# Patient Record
Sex: FEMALE | Race: WHITE | NOT HISPANIC OR LATINO | Employment: FULL TIME | URBAN - METROPOLITAN AREA
[De-identification: names, ages, dates, MRNs, and addresses within clinical notes are randomized per-mention and may not be internally consistent; named-entity substitution may affect disease eponyms.]

---

## 2017-01-03 RX ORDER — NORGESTIMATE AND ETHINYL ESTRADIOL
KIT
Qty: 28 TAB | Refills: 5 | Status: SHIPPED | OUTPATIENT
Start: 2017-01-03 | End: 2017-08-28

## 2017-02-13 ENCOUNTER — HOSPITAL ENCOUNTER (OUTPATIENT)
Dept: LAB | Facility: MEDICAL CENTER | Age: 22
End: 2017-02-13
Attending: NURSE PRACTITIONER
Payer: COMMERCIAL

## 2017-02-13 ENCOUNTER — HOSPITAL ENCOUNTER (OUTPATIENT)
Dept: LAB | Facility: MEDICAL CENTER | Age: 22
End: 2017-02-13
Attending: INTERNAL MEDICINE
Payer: COMMERCIAL

## 2017-02-13 DIAGNOSIS — G40.909 SEIZURE DISORDER (HCC): ICD-10-CM

## 2017-02-13 LAB
ALBUMIN SERPL BCP-MCNC: 3.9 G/DL (ref 3.2–4.9)
ALBUMIN/GLOB SERPL: 1 G/DL
ALP SERPL-CCNC: 53 U/L (ref 30–99)
ALT SERPL-CCNC: 18 U/L (ref 2–50)
ANION GAP SERPL CALC-SCNC: 8 MMOL/L (ref 0–11.9)
AST SERPL-CCNC: 15 U/L (ref 12–45)
BASOPHILS # BLD AUTO: 0.04 K/UL (ref 0–0.12)
BASOPHILS NFR BLD AUTO: 0.6 % (ref 0–1.8)
BILIRUB SERPL-MCNC: 0.3 MG/DL (ref 0.1–1.5)
BUN SERPL-MCNC: 16 MG/DL (ref 8–22)
CALCIUM SERPL-MCNC: 9.3 MG/DL (ref 8.5–10.5)
CHLORIDE SERPL-SCNC: 107 MMOL/L (ref 96–112)
CO2 SERPL-SCNC: 22 MMOL/L (ref 20–33)
CREAT SERPL-MCNC: 0.61 MG/DL (ref 0.5–1.4)
EOSINOPHIL # BLD: 0.02 K/UL (ref 0–0.51)
EOSINOPHIL NFR BLD AUTO: 0.3 % (ref 0–6.9)
ERYTHROCYTE [DISTWIDTH] IN BLOOD BY AUTOMATED COUNT: 36.6 FL (ref 35.9–50)
GLOBULIN SER CALC-MCNC: 4 G/DL (ref 1.9–3.5)
GLUCOSE SERPL-MCNC: 90 MG/DL (ref 65–99)
HCT VFR BLD AUTO: 38.8 % (ref 37–47)
HGB BLD-MCNC: 13 G/DL (ref 12–16)
IMM GRANULOCYTES # BLD AUTO: 0.02 K/UL (ref 0–0.11)
IMM GRANULOCYTES NFR BLD AUTO: 0.3 % (ref 0–0.9)
LYMPHOCYTES # BLD: 2.88 K/UL (ref 1–4.8)
LYMPHOCYTES NFR BLD AUTO: 40.4 % (ref 22–41)
MCH RBC QN AUTO: 29.9 PG (ref 27–33)
MCHC RBC AUTO-ENTMCNC: 33.5 G/DL (ref 33.6–35)
MCV RBC AUTO: 89.2 FL (ref 81.4–97.8)
MONOCYTES # BLD: 0.53 K/UL (ref 0–0.85)
MONOCYTES NFR BLD AUTO: 7.4 % (ref 0–13.4)
NEUTROPHILS # BLD: 3.63 K/UL (ref 2–7.15)
NEUTROPHILS NFR BLD AUTO: 51 % (ref 44–72)
NRBC # BLD AUTO: 0 K/UL
NRBC BLD-RTO: 0 /100 WBC
PLATELET # BLD AUTO: 172 K/UL (ref 164–446)
PMV BLD AUTO: 11.7 FL (ref 9–12.9)
POTASSIUM SERPL-SCNC: 4 MMOL/L (ref 3.6–5.5)
PROT SERPL-MCNC: 7.9 G/DL (ref 6–8.2)
RBC # BLD AUTO: 4.35 M/UL (ref 4.2–5.4)
SODIUM SERPL-SCNC: 137 MMOL/L (ref 135–145)
WBC # BLD AUTO: 7.1 K/UL (ref 4.8–10.8)

## 2017-02-13 PROCEDURE — 80177 DRUG SCRN QUAN LEVETIRACETAM: CPT | Mod: 91

## 2017-02-13 PROCEDURE — 85025 COMPLETE CBC W/AUTO DIFF WBC: CPT

## 2017-02-13 PROCEDURE — 80177 DRUG SCRN QUAN LEVETIRACETAM: CPT

## 2017-02-13 PROCEDURE — 80053 COMPREHEN METABOLIC PANEL: CPT

## 2017-02-13 PROCEDURE — 36415 COLL VENOUS BLD VENIPUNCTURE: CPT

## 2017-02-14 LAB
LEVETIRACETAM SERPL-MCNC: 43 UG/ML (ref 12–46)
LEVETIRACETAM SERPL-MCNC: 43 UG/ML (ref 12–46)

## 2017-02-27 ENCOUNTER — OFFICE VISIT (OUTPATIENT)
Dept: NEUROLOGY | Facility: MEDICAL CENTER | Age: 22
End: 2017-02-27
Payer: COMMERCIAL

## 2017-02-27 VITALS
RESPIRATION RATE: 16 BRPM | HEIGHT: 62 IN | SYSTOLIC BLOOD PRESSURE: 106 MMHG | BODY MASS INDEX: 35.22 KG/M2 | TEMPERATURE: 97.7 F | OXYGEN SATURATION: 95 % | WEIGHT: 191.4 LBS | HEART RATE: 80 BPM | DIASTOLIC BLOOD PRESSURE: 64 MMHG

## 2017-02-27 DIAGNOSIS — G40.909 SEIZURE DISORDER (HCC): ICD-10-CM

## 2017-02-27 DIAGNOSIS — G43.009 MIGRAINE WITHOUT AURA AND WITHOUT STATUS MIGRAINOSUS, NOT INTRACTABLE: ICD-10-CM

## 2017-02-27 PROCEDURE — 99213 OFFICE O/P EST LOW 20 MIN: CPT | Performed by: NURSE PRACTITIONER

## 2017-02-27 ASSESSMENT — ENCOUNTER SYMPTOMS
CONSTITUTIONAL NEGATIVE: 1
DIARRHEA: 0
NAUSEA: 0
DOUBLE VISION: 0
DEPRESSION: 0
MUSCULOSKELETAL NEGATIVE: 1
COUGH: 0
VOMITING: 0
HEADACHES: 0
ABDOMINAL PAIN: 0
SORE THROAT: 0
NERVOUS/ANXIOUS: 0

## 2017-02-27 ASSESSMENT — PATIENT HEALTH QUESTIONNAIRE - PHQ9: CLINICAL INTERPRETATION OF PHQ2 SCORE: 0

## 2017-02-27 NOTE — PROGRESS NOTES
"Subjective:      Laura Rosario is a 21 y.o. female who presents with Follow-Up for Primary Generalized Epilepsy.        HPI Appreciates the new Keppra XR.  She feels like her \"fogginess has cleared\".  Doing very well and does not need any changes.    Driving, does not like to drive at night.  But she has felt this way ever since she started driving.    Diagnosed with bronchitis and was treated intercurrently.     Ref. Range 2/13/2017 08:42   Keppra Latest Ref Range: 12-46 ug/mL 43     Current Outpatient Prescriptions   Medication Sig Dispense Refill   • MONONESSA 0.25-35 MG-MCG per tablet TAKE 1 TABLET BY MOUTH EVERY DAY 28 Tab 5   • Levetiracetam (KEPPRA XR) 750 MG TABLET SR 24 HR Take two tablets po qam and three tablets po qhs. 150 Tab 5   • Pyridoxine HCl (B-6 PO) Take  by mouth.     • pyridoxine 100 MG tablet Take 1 Tab by mouth every day. 30 Tab 11   • levetiracetam (KEPPRA) 500 MG Tab Take 1500mg PO BID. (Patient not taking: Reported on 2/27/2017) 180 Tab 5   • eletriptan (RELPAX) 40 MG tablet Take 1/2-1 tablet po at onset of headache, may repeat dose in 2 hours if unrelieved.  Do not exceed more than 2 tablets in 24 hours. (Patient not taking: Reported on 2/27/2017) 9 Tab 5   • lorazepam (ATIVAN) 1 MG Tab Take 1/2-1 tablet PO PRN breakthrough seizures.  Do not exceed more than 2 tablets in 24 hours unless directed otherwise by physician. 20 Tab 0   • vitamin D (CHOLECALCIFEROL) 1000 UNIT Tab Take 1,000 Units by mouth every day.     • Multiple Vitamin (MULTI VITAMIN DAILY PO) Take  by mouth.     • Cyanocobalamin (B-12 PO) Take  by mouth.       No current facility-administered medications for this visit.       Review of Systems   Constitutional: Negative.    HENT: Negative for hearing loss, nosebleeds and sore throat.         No recent head injury.   Eyes: Negative for double vision.        No new loss of vision.   Respiratory: Negative for cough.         No recent lung infections.   Cardiovascular: " "Negative for chest pain.   Gastrointestinal: Negative for nausea, vomiting, abdominal pain and diarrhea.   Genitourinary: Negative.    Musculoskeletal: Negative.    Skin: Negative.    Neurological: Negative for headaches.   Endo/Heme/Allergies:        No history of endocrine dysfunction.  No new problems.   Psychiatric/Behavioral: Negative for depression. The patient is not nervous/anxious.         No recent mood changes.          Objective:     /64 mmHg  Pulse 80  Temp(Src) 36.5 °C (97.7 °F)  Resp 16  Ht 1.575 m (5' 2.01\")  Wt 86.818 kg (191 lb 6.4 oz)  BMI 35.00 kg/m2  SpO2 95%     Physical Exam   Constitutional: She is oriented to person, place, and time. She appears well-developed and well-nourished.   HENT:   Head: Normocephalic and atraumatic.   Eyes: Pupils are equal, round, and reactive to light.   Neck: Normal range of motion.   Cardiovascular: Normal rate and regular rhythm.    Pulmonary/Chest: Effort normal.   Neurological: She is alert and oriented to person, place, and time. She exhibits normal muscle tone. Gait normal.   No observable changes in neurologic status.  See initial new patient examination for details.    Skin: Skin is warm.   Psychiatric: She has a normal mood and affect.               Assessment/Plan:     Seizure disorder, probable generalized epilepsy:  Three concerning events since May 2016.  Last seizure occurred August 5, 2016.  Currently taking LEV XR 1500-2250mg.     Ref. Range 2/13/2017 08:42   Keppra Latest Ref Range: 12-46 ug/mL 43     Headaches:  Occuring rarely and relieved effectively with NSAID's.    Obtain VEEG as a comparison study prior to next appointment.    Needs to be taking a MVI, Calcium 1000mg and Vit D3 1000IU per day.  Needs folic acid 1mg per day.  Needs to continue at least one form of birth control.  Continue Vit B6 100mg per day for irritability.    Needs a MedicAlert bracelet.    Counseled regarding cautious driving, particularly avoiding night " time driving.    Return for follow-up in 4 months.   I spent 35 minutes with this patient, over fifty percent was spent counseling patient on their condition, best management practices, reviewing test results and risks and benefits of treatment.

## 2017-03-09 ENCOUNTER — NON-PROVIDER VISIT (OUTPATIENT)
Dept: NEUROLOGY | Facility: MEDICAL CENTER | Age: 22
End: 2017-03-09
Payer: COMMERCIAL

## 2017-03-09 DIAGNOSIS — G40.909 SEIZURE DISORDER (HCC): ICD-10-CM

## 2017-03-09 PROCEDURE — 95951 PR EEG MONITORING/VIDEORECORD: CPT | Mod: 52 | Performed by: PSYCHIATRY & NEUROLOGY

## 2017-03-09 NOTE — MR AVS SNAPSHOT
Laura Rosario   3/9/2017 1:00 PM   Non-Provider Visit   MRN: [id number]    Department:  [facility]   Dept Phone:  [phone]    Description:  Female : 1995   [place]  [place] [place] [facility]           Allergies as of 3/9/2017     No Known Allergies      You were diagnosed with     Seizure disorder (CMS-[facility])   [858002]         Vital Signs     Smoking Status                   Never Smoker            Basic Information     Date Of Birth Sex Race Ethnicity Preferred Language    1995 Female White Non- English      Your appointments     Aug 28, 2017  9:00 AM   Follow Up Visit with CORNELIUS [facility]   [facility] (--)    [address], [address]  [facility] [address] [phone]           You will be receiving a confirmation call a few days before your appointment from our automated call confirmation system.              Problem List              ICD-10-CM Priority Class Noted - Resolved    Migraine without aura and without status migrainosus, not intractable G43.009   2016 - Present    BMI 32.0-32.9,adult Z68.32   2016 - Present    Uses birth control Z30.9   2016 - Present    Left upper arm pain M79.622   2016 - Present      Health Maintenance        Date Due Completion Dates    IMM VARICELLA (CHICKENPOX) VACCINE (1 of 2 - 2 Dose Adolescent Series) 2006 ---    IMM INFLUENZA (1) 2016 ---    PAP SMEAR 2017    IMM DTaP/Tdap/Td Vaccine (7 - Td) 2018, 1997, 2/15/1995, 1994, 1994, 1/10/1994            Current Immunizations     Dtap Vaccine 1997, 2/15/1995, 1994, 1994, 1/10/1994    HIB Vaccine (ACTHIB/HIBERIX) 2/15/1995, 1994, 1994, 1/10/1994    HPV Quadrivalent Vaccine (GARDASIL) 2012, 2012, 2012    Hepatitis A Vaccine, Ped/Adol 2010, 7/15/2008    Hepatitis B Vaccine Non-Recombivax (Ped/Adol) 1994, 1994, 1/10/1994    MMR Vaccine  12/8/1997, 2/15/1995    Tdap Vaccine 1/28/2008      Below and/or attached are the medications your provider expects you to take. Review all of your home medications and newly ordered medications with your provider and/or pharmacist. Follow medication instructions as directed by your provider and/or pharmacist. Please keep your medication list with you and share with your provider. Update the information when medications are discontinued, doses are changed, or new medications (including over-the-counter products) are added; and carry medication information at all times in the event of emergency situations     Allergies:  No Known Allergies          Medications  Valid as of: March 09, 2017 -  2:04 PM    Generic Name Brand Name Tablet Size Instructions for use    Cholecalciferol (Tab) cholecalciferol 1000 UNIT Take 1,000 Units by mouth every day.        Cyanocobalamin   Take  by mouth.        LevETIRAcetam (TABLET SR 24 HR) Levetiracetam 750 MG Take two tablets po qam and three tablets po qhs.        LORazepam (Tab) ATIVAN 1 MG Take 1/2-1 tablet PO PRN breakthrough seizures.  Do not exceed more than 2 tablets in 24 hours unless directed otherwise by physician.        Multiple Vitamin   Take  by mouth.        Norgestimate-Eth Estradiol (Tab) MONONESSA 0.25-35 MG-MCG TAKE 1 TABLET BY MOUTH EVERY DAY        Pyridoxine HCl   Take  by mouth.        Pyridoxine HCl (Tab) pyridoxine 100 MG Take 1 Tab by mouth every day.        .                 Medicines prescribed today were sent to:     [facility] [address] [place], [place] - [address]    [address] [facility] [address]    Phone: [phone] Fax: [phone]    Open 24 Hours?: No    [id number] - [place], [place] - [address]    [address] [facility] [address]    Phone: [phone] Fax: [phone]    Open 24 Hours?: No      Medication refill instructions:       If your prescription bottle indicates you have  medication refills left, it is not necessary to call your provider’s office. Please contact your pharmacy and they will refill your medication.    If your prescription bottle indicates you do not have any refills left, you may request refills at any time through one of the following ways: The online [employer] system (except Urgent Care), by calling your provider’s office, or by asking your pharmacy to contact your provider’s office with a refill request. Medication refills are processed only during regular business hours and may not be available until the next business day. Your provider may request additional information or to have a follow-up visit with you prior to refilling your medication.   *Please Note: Medication refills are assigned a new Rx number when refilled electronically. Your pharmacy may indicate that no refills were authorized even though a new prescription for the same medication is available at the pharmacy. Please request the medicine by name with the pharmacy before contacting your provider for a refill.           [employer] Access Code: Activation code not generated  Current [employer] Status: Active

## 2017-03-10 NOTE — PROGRESS NOTES
VIDEO ELECTROENCEPHALOGRAM REPORT      Referring provider: MARYSOL Rain.    DOS:  3/9/2017 (total recording of 26 minutes and 42 seconds).     INDICATION:  Laura Rosario 21 y.o. female presenting with history of primary generalized epilepsy. Follow up EEG to assess therapeutic response and rule out subclinical seizures.     CURRENT ANTIEPILEPTIC REGIMEN: Levetiracetam  mg po daily.     TECHNIQUE: 30 channel video electroencephalogram (EEG) was performed in accordance with the international 10-20 system. The study was reviewed in bipolar and referential montages. The recording examined the patient during wakeful and drowsy/sleep state(s).     DESCRIPTION OF THE RECORD:  During the wakefulness, the background showed a symmetrical 9 Hz alpha activity posteriorly with amplitude of 70 mV.  There was reactivity to eye closure/opening.  A normal anterior-posterior gradient was noted with faster beta frequencies seen anteriorly.  During drowsiness, theta/delta frequencies were seen.    During the sleep state, background shows diffuse high-amplitude 4-5 Hz delta activity.  Symmetrical high-amplitude sleep spindles and vertex sharps were seen in the leads over the central regions.     ACTIVATION PROCEDURES:   Hyperventilation was performed by the patient for a total of 3 minutes. The technician performing the test noted good effort. This technique produced electroencephalographic changes in keeping with the expected bilaterally synchronous, frontally predominant, high amplitude slow waves build up.     Intermittent Photic stimulation was performed in a stepwise fashion from 1 to 30 Hz and elicited a normal response (photic driving), most noticeable in the posterior leads.      ICTAL AND/OR INTERICTAL FINDINGS:   No focal or generalized epileptiform activity noted. No regional slowing was seen during this routine study.  No clinical events or seizures were reported or recorded during the study.     EKG:  sampling of the EKG recording demonstrated sinus rhythm.       INTERPRETATION:  This is a normal video EEG recording in the awake, drowsy, and sleep state(s).  Clinical correlation is recommended.    Note: A normal EEG does not rule out epilepsy.  If the clinical suspicion remains high for seizures, a prolonged recording to capture clinical or subclinical events may be helpful.        Jamil Almazan MD  Medical Director, Epilepsy and Neurodiagnostics.   Clinical  of Neurology [facility].   Diplomate in Neurology, Epilepsy, and Electrodiagnostic Medicine.   Office: [phone]  Fax: [phone]

## 2017-03-10 NOTE — PROCEDURES
VIDEO ELECTROENCEPHALOGRAM REPORT      Referring provider: MARYSOL Rain.    DOS:  3/9/2017 (total recording of 26 minutes and 42 seconds).     INDICATION:  Laura Rosario 21 y.o. female presenting with history of primary generalized epilepsy. Follow up EEG to assess therapeutic response and rule out subclinical seizures.     CURRENT ANTIEPILEPTIC REGIMEN: Levetiracetam  mg po daily.     TECHNIQUE: 30 channel video electroencephalogram (EEG) was performed in accordance with the international 10-20 system. The study was reviewed in bipolar and referential montages. The recording examined the patient during wakeful and drowsy/sleep state(s).     DESCRIPTION OF THE RECORD:  During the wakefulness, the background showed a symmetrical 9 Hz alpha activity posteriorly with amplitude of 70 mV.  There was reactivity to eye closure/opening.  A normal anterior-posterior gradient was noted with faster beta frequencies seen anteriorly.  During drowsiness, theta/delta frequencies were seen.    During the sleep state, background shows diffuse high-amplitude 4-5 Hz delta activity.  Symmetrical high-amplitude sleep spindles and vertex sharps were seen in the leads over the central regions.     ACTIVATION PROCEDURES:    Hyperventilation was performed by the patient for a total of 3 minutes. The technician performing the test noted good effort. This technique produced electroencephalographic changes in keeping with the expected bilaterally synchronous, frontally predominant, high amplitude slow waves build up.     Intermittent Photic stimulation was performed in a stepwise fashion from 1 to 30 Hz and elicited a normal response (photic driving), most noticeable in the posterior leads.      ICTAL AND/OR INTERICTAL FINDINGS:    No focal or generalized epileptiform activity noted. No regional slowing was seen during this routine study.  No clinical events or seizures were reported or recorded during the study.      EKG:  sampling of the EKG recording demonstrated sinus rhythm.        INTERPRETATION:  This is a normal video EEG recording in the awake, drowsy, and sleep state(s).  Clinical correlation is recommended.    Note: A normal EEG does not rule out epilepsy.  If the clinical suspicion remains high for seizures, a prolonged recording to capture clinical or subclinical events may be helpful.        Jamil Almazan MD  Medical Director, Epilepsy and Neurodiagnostics.    Clinical  of Neurology [facility].    Diplomate in Neurology, Epilepsy, and Electrodiagnostic Medicine.    Office: [phone]  Fax: [phone]    WOOD / AMADO    DD:  03/10/2017 07:14:21  DT:  03/10/2017 10:03:17    D#:  [id number]  Job#:  [id number]    cc: MAXWELL GREGG

## 2017-07-27 ENCOUNTER — HOSPITAL ENCOUNTER (OUTPATIENT)
Dept: RADIOLOGY | Facility: MEDICAL CENTER | Age: 22
End: 2017-07-27
Attending: INTERNAL MEDICINE
Payer: COMMERCIAL

## 2017-07-27 DIAGNOSIS — M54.17 LUMBOSACRAL RADICULITIS: ICD-10-CM

## 2017-07-27 PROCEDURE — 73502 X-RAY EXAM HIP UNI 2-3 VIEWS: CPT | Mod: RT

## 2017-07-31 ENCOUNTER — HOSPITAL ENCOUNTER (OUTPATIENT)
Facility: MEDICAL CENTER | Age: 22
End: 2017-07-31
Attending: INTERNAL MEDICINE
Payer: COMMERCIAL

## 2017-07-31 PROCEDURE — 88175 CYTOPATH C/V AUTO FLUID REDO: CPT

## 2017-07-31 PROCEDURE — 87591 N.GONORRHOEAE DNA AMP PROB: CPT

## 2017-07-31 PROCEDURE — 87491 CHLMYD TRACH DNA AMP PROBE: CPT

## 2017-08-01 LAB — CYTOLOGY REG CYTOL: NORMAL

## 2017-08-02 LAB
C TRACH DNA GENITAL QL NAA+PROBE: NEGATIVE
N GONORRHOEA DNA GENITAL QL NAA+PROBE: NEGATIVE
SPECIMEN SOURCE: NORMAL

## 2017-08-28 ENCOUNTER — OFFICE VISIT (OUTPATIENT)
Dept: NEUROLOGY | Facility: MEDICAL CENTER | Age: 22
End: 2017-08-28
Payer: COMMERCIAL

## 2017-08-28 VITALS
TEMPERATURE: 98.4 F | HEART RATE: 50 BPM | WEIGHT: 187.2 LBS | DIASTOLIC BLOOD PRESSURE: 60 MMHG | RESPIRATION RATE: 16 BRPM | HEIGHT: 62 IN | OXYGEN SATURATION: 95 % | BODY MASS INDEX: 34.45 KG/M2 | SYSTOLIC BLOOD PRESSURE: 100 MMHG

## 2017-08-28 DIAGNOSIS — G40.909 SEIZURE DISORDER (HCC): ICD-10-CM

## 2017-08-28 PROCEDURE — 99213 OFFICE O/P EST LOW 20 MIN: CPT | Performed by: NURSE PRACTITIONER

## 2017-08-28 RX ORDER — LEVETIRACETAM 750 MG/1
TABLET, FILM COATED, EXTENDED RELEASE ORAL
Qty: 150 TAB | Refills: 11 | Status: SHIPPED | OUTPATIENT
Start: 2017-08-28 | End: 2019-09-17

## 2017-08-28 RX ORDER — IBUPROFEN 800 MG/1
TABLET ORAL
Refills: 4 | COMMUNITY
Start: 2017-06-12

## 2017-08-28 RX ORDER — NORGESTIMATE-ETHINYL ESTRADIOL 7DAYSX3 28
TABLET ORAL
COMMUNITY
Start: 2017-08-08 | End: 2018-09-20

## 2017-08-28 ASSESSMENT — ENCOUNTER SYMPTOMS
SEIZURES: 0
NERVOUS/ANXIOUS: 0
DEPRESSION: 0
DIZZINESS: 0
SORE THROAT: 0
DIARRHEA: 0
VOMITING: 0
COUGH: 0
MUSCULOSKELETAL NEGATIVE: 1
DOUBLE VISION: 0
CONSTITUTIONAL NEGATIVE: 1
ABDOMINAL PAIN: 0
HEADACHES: 0
NAUSEA: 0

## 2017-08-28 NOTE — PROGRESS NOTES
Subjective:      Laura Rosario is a 21 y.o. female who presents with Follow-Up (Seizure disorder ([facility]))          HPI   States that her life is still in chaos but she does not expand.    She ultimately does not have any concerns today or questions for me.    Working full-time.      Ref. Range 2/13/2017 08:42   Keppra Latest Ref Range: 12-46 ug/mL 43          Current Outpatient Prescriptions   Medication Sig Dispense Refill   • ibuprofen (MOTRIN) 800 MG Tab TK 1 T PO Q 8 H PRF ORTHOPEDIC PAIN OR HEADACHE  4   • TRINESSA, 28, 0.18/0.215/0.25 MG-35 MCG Tab      • Levetiracetam 750 MG TABLET SR 24 HR TAKE 2 TABLETS BY MOUTH EVERY MORNING AND 3 TABLETS BY MOUTH AT BEDTIME 150 Tab 5   • lorazepam (ATIVAN) 1 MG Tab Take 1/2-1 tablet PO PRN breakthrough seizures.  Do not exceed more than 2 tablets in 24 hours unless directed otherwise by physician. 20 Tab 0   • vitamin D (CHOLECALCIFEROL) 1000 UNIT Tab Take 1,000 Units by mouth every day.     • Multiple Vitamin (MULTI VITAMIN DAILY PO) Take  by mouth.     • Cyanocobalamin (B-12 PO) Take  by mouth.       No current facility-administered medications for this visit.        Review of Systems   Constitutional: Negative.    HENT: Negative for hearing loss, nosebleeds and sore throat.         No recent head injury.   Eyes: Negative for double vision.        No new loss of vision.   Respiratory: Negative for cough.         No recent lung infections.   Cardiovascular: Negative for chest pain.   Gastrointestinal: Negative for abdominal pain, diarrhea, nausea and vomiting.   Genitourinary: Negative.    Musculoskeletal: Negative.    Skin: Negative.    Neurological: Negative for dizziness, seizures and headaches.   Endo/Heme/Allergies:        No history of endocrine dysfunction.  No new problems.   Psychiatric/Behavioral: Negative for depression. The patient is not nervous/anxious.         No recent mood changes.          Objective:     /60   Pulse (!) 50   Temp 36.9 °C  "(98.4 °F)   Resp 16   Ht 1.575 m (5' 2\")   Wt 84.9 kg (187 lb 3.2 oz)   SpO2 95%   BMI 34.24 kg/m²      Physical Exam   Constitutional: She is oriented to person, place, and time. She appears well-developed and well-nourished. No distress.   HENT:   Head: Normocephalic and atraumatic.   Eyes: Pupils are equal, round, and reactive to light.   Neck: Normal range of motion.   Cardiovascular: Normal rate and regular rhythm.  Exam reveals no gallop and no friction rub.    No murmur heard.  Pulmonary/Chest: Effort normal. No respiratory distress.   Abdominal: Soft.   Musculoskeletal: Normal range of motion.   Lymphadenopathy:     She has no cervical adenopathy.   Neurological: She is alert and oriented to person, place, and time. She displays no tremor. Gait normal.   No observable changes in neurologic status.  See initial new patient examination for details.       Skin: Skin is warm and dry.   Psychiatric: She has a normal mood and affect.           Assessment/Plan:     Seizure disorder, probable generalized epilepsy:  Last seizure occurred August 5, 2016.  Currently taking LEV XR 1500-2250mg.  No further concerns for seizures in one year now.       Ref. Range 2/13/2017 08:42   Keppra Latest Ref Range: 12-46 ug/mL 43      Headaches:  Occuring rarely and relieved effectively with NSAID's.     Obtain VEEG as a comparison study prior to next appointment.     Needs to be taking a MVI, Calcium 1000mg and Vit D3 1000IU per day.  Needs folic acid 1mg per day.  Needs to continue at least one form of birth control.  Continue Vit B6 100mg per day for irritability.     Needs a MedicAlert bracelet.     Counseled regarding cautious driving, particularly avoiding night time driving.     Return for follow-up in 6 months.   I spent 35 minutes with this patient, over fifty percent was spent counseling patient on their condition, best management practices, reviewing test results and risks and benefits of treatment.         "

## 2017-09-01 ENCOUNTER — APPOINTMENT (OUTPATIENT)
Dept: RADIOLOGY | Facility: MEDICAL CENTER | Age: 22
End: 2017-09-01
Attending: INTERNAL MEDICINE
Payer: COMMERCIAL

## 2017-09-01 DIAGNOSIS — M54.17 LUMBOSACRAL NEURITIS: ICD-10-CM

## 2017-09-01 PROCEDURE — 72148 MRI LUMBAR SPINE W/O DYE: CPT

## 2017-09-29 ENCOUNTER — HOSPITAL ENCOUNTER (OUTPATIENT)
Dept: LAB | Facility: MEDICAL CENTER | Age: 22
End: 2017-09-29
Attending: INTERNAL MEDICINE
Payer: COMMERCIAL

## 2017-09-29 LAB
ALBUMIN SERPL BCP-MCNC: 4 G/DL (ref 3.2–4.9)
ALBUMIN/GLOB SERPL: 1.1 G/DL
ALP SERPL-CCNC: 50 U/L (ref 30–99)
ALT SERPL-CCNC: 11 U/L (ref 2–50)
ANION GAP SERPL CALC-SCNC: 8 MMOL/L (ref 0–11.9)
AST SERPL-CCNC: 15 U/L (ref 12–45)
BASOPHILS # BLD AUTO: 0.6 % (ref 0–1.8)
BASOPHILS # BLD: 0.04 K/UL (ref 0–0.12)
BILIRUB SERPL-MCNC: 0.4 MG/DL (ref 0.1–1.5)
BUN SERPL-MCNC: 12 MG/DL (ref 8–22)
CALCIUM SERPL-MCNC: 9.5 MG/DL (ref 8.5–10.5)
CHLORIDE SERPL-SCNC: 107 MMOL/L (ref 96–112)
CHOLEST SERPL-MCNC: 167 MG/DL (ref 100–199)
CO2 SERPL-SCNC: 23 MMOL/L (ref 20–33)
CREAT SERPL-MCNC: 0.62 MG/DL (ref 0.5–1.4)
EOSINOPHIL # BLD AUTO: 0.07 K/UL (ref 0–0.51)
EOSINOPHIL NFR BLD: 1.1 % (ref 0–6.9)
ERYTHROCYTE [DISTWIDTH] IN BLOOD BY AUTOMATED COUNT: 38.6 FL (ref 35.9–50)
GFR SERPL CREATININE-BSD FRML MDRD: >60 ML/MIN/1.73 M 2
GLOBULIN SER CALC-MCNC: 3.8 G/DL (ref 1.9–3.5)
GLUCOSE SERPL-MCNC: 103 MG/DL (ref 65–99)
HCT VFR BLD AUTO: 41.7 % (ref 37–47)
HDLC SERPL-MCNC: 47 MG/DL
HGB BLD-MCNC: 14.6 G/DL (ref 12–16)
IMM GRANULOCYTES # BLD AUTO: 0.04 K/UL (ref 0–0.11)
IMM GRANULOCYTES NFR BLD AUTO: 0.6 % (ref 0–0.9)
LDLC SERPL CALC-MCNC: 101 MG/DL
LYMPHOCYTES # BLD AUTO: 3.02 K/UL (ref 1–4.8)
LYMPHOCYTES NFR BLD: 46.4 % (ref 22–41)
MCH RBC QN AUTO: 32.2 PG (ref 27–33)
MCHC RBC AUTO-ENTMCNC: 35 G/DL (ref 33.6–35)
MCV RBC AUTO: 91.9 FL (ref 81.4–97.8)
MONOCYTES # BLD AUTO: 0.5 K/UL (ref 0–0.85)
MONOCYTES NFR BLD AUTO: 7.7 % (ref 0–13.4)
NEUTROPHILS # BLD AUTO: 2.84 K/UL (ref 2–7.15)
NEUTROPHILS NFR BLD: 43.6 % (ref 44–72)
NRBC # BLD AUTO: 0 K/UL
NRBC BLD AUTO-RTO: 0 /100 WBC
PLATELET # BLD AUTO: 145 K/UL (ref 164–446)
PMV BLD AUTO: 13.1 FL (ref 9–12.9)
POTASSIUM SERPL-SCNC: 4 MMOL/L (ref 3.6–5.5)
PROT SERPL-MCNC: 7.8 G/DL (ref 6–8.2)
RBC # BLD AUTO: 4.54 M/UL (ref 4.2–5.4)
SODIUM SERPL-SCNC: 138 MMOL/L (ref 135–145)
TRIGL SERPL-MCNC: 94 MG/DL (ref 0–149)
WBC # BLD AUTO: 6.5 K/UL (ref 4.8–10.8)

## 2017-09-29 PROCEDURE — 36415 COLL VENOUS BLD VENIPUNCTURE: CPT

## 2017-09-29 PROCEDURE — 80053 COMPREHEN METABOLIC PANEL: CPT

## 2017-09-29 PROCEDURE — 85025 COMPLETE CBC W/AUTO DIFF WBC: CPT

## 2017-09-29 PROCEDURE — 80061 LIPID PANEL: CPT

## 2017-11-06 ENCOUNTER — HOSPITAL ENCOUNTER (OUTPATIENT)
Dept: LAB | Facility: MEDICAL CENTER | Age: 22
End: 2017-11-06
Attending: INTERNAL MEDICINE
Payer: COMMERCIAL

## 2017-11-06 LAB
EST. AVERAGE GLUCOSE BLD GHB EST-MCNC: 111 MG/DL
HBA1C MFR BLD: 5.5 % (ref 0–5.6)
T4 FREE SERPL-MCNC: 0.87 NG/DL (ref 0.53–1.43)
TSH SERPL DL<=0.005 MIU/L-ACNC: 1.04 UIU/ML (ref 0.3–3.7)

## 2017-11-06 PROCEDURE — 84439 ASSAY OF FREE THYROXINE: CPT

## 2017-11-06 PROCEDURE — 84443 ASSAY THYROID STIM HORMONE: CPT

## 2017-11-06 PROCEDURE — 83036 HEMOGLOBIN GLYCOSYLATED A1C: CPT

## 2017-11-06 PROCEDURE — 36415 COLL VENOUS BLD VENIPUNCTURE: CPT

## 2017-11-06 PROCEDURE — 80177 DRUG SCRN QUAN LEVETIRACETAM: CPT

## 2017-11-08 LAB — LEVETIRACETAM SERPL-MCNC: 36 UG/ML (ref 12–46)

## 2018-02-09 ENCOUNTER — HOSPITAL ENCOUNTER (OUTPATIENT)
Dept: RADIOLOGY | Facility: MEDICAL CENTER | Age: 23
End: 2018-02-09
Attending: INTERNAL MEDICINE
Payer: COMMERCIAL

## 2018-02-09 ENCOUNTER — HOSPITAL ENCOUNTER (OUTPATIENT)
Dept: LAB | Facility: MEDICAL CENTER | Age: 23
End: 2018-02-09
Attending: INTERNAL MEDICINE
Payer: COMMERCIAL

## 2018-02-09 DIAGNOSIS — R10.11 RUQ PAIN: ICD-10-CM

## 2018-02-09 LAB
ALBUMIN SERPL BCP-MCNC: 3.8 G/DL (ref 3.2–4.9)
ALBUMIN/GLOB SERPL: 1.1 G/DL
ALP SERPL-CCNC: 52 U/L (ref 30–99)
ALT SERPL-CCNC: 15 U/L (ref 2–50)
ANION GAP SERPL CALC-SCNC: 7 MMOL/L (ref 0–11.9)
AST SERPL-CCNC: 18 U/L (ref 12–45)
BASOPHILS # BLD AUTO: 0.5 % (ref 0–1.8)
BASOPHILS # BLD: 0.03 K/UL (ref 0–0.12)
BILIRUB SERPL-MCNC: 0.4 MG/DL (ref 0.1–1.5)
BUN SERPL-MCNC: 12 MG/DL (ref 8–22)
CALCIUM SERPL-MCNC: 9.1 MG/DL (ref 8.5–10.5)
CHLORIDE SERPL-SCNC: 109 MMOL/L (ref 96–112)
CO2 SERPL-SCNC: 25 MMOL/L (ref 20–33)
CREAT SERPL-MCNC: 0.63 MG/DL (ref 0.5–1.4)
EOSINOPHIL # BLD AUTO: 0.07 K/UL (ref 0–0.51)
EOSINOPHIL NFR BLD: 1.2 % (ref 0–6.9)
ERYTHROCYTE [DISTWIDTH] IN BLOOD BY AUTOMATED COUNT: 38.7 FL (ref 35.9–50)
GLOBULIN SER CALC-MCNC: 3.6 G/DL (ref 1.9–3.5)
GLUCOSE SERPL-MCNC: 83 MG/DL (ref 65–99)
HCT VFR BLD AUTO: 40.7 % (ref 37–47)
HGB BLD-MCNC: 13.4 G/DL (ref 12–16)
IMM GRANULOCYTES # BLD AUTO: 0.01 K/UL (ref 0–0.11)
IMM GRANULOCYTES NFR BLD AUTO: 0.2 % (ref 0–0.9)
LYMPHOCYTES # BLD AUTO: 2.35 K/UL (ref 1–4.8)
LYMPHOCYTES NFR BLD: 41 % (ref 22–41)
MCH RBC QN AUTO: 29.9 PG (ref 27–33)
MCHC RBC AUTO-ENTMCNC: 32.9 G/DL (ref 33.6–35)
MCV RBC AUTO: 90.8 FL (ref 81.4–97.8)
MONOCYTES # BLD AUTO: 0.44 K/UL (ref 0–0.85)
MONOCYTES NFR BLD AUTO: 7.7 % (ref 0–13.4)
NEUTROPHILS # BLD AUTO: 2.83 K/UL (ref 2–7.15)
NEUTROPHILS NFR BLD: 49.4 % (ref 44–72)
NRBC # BLD AUTO: 0 K/UL
NRBC BLD-RTO: 0 /100 WBC
PLATELET # BLD AUTO: 141 K/UL (ref 164–446)
PMV BLD AUTO: 13.6 FL (ref 9–12.9)
POTASSIUM SERPL-SCNC: 4.4 MMOL/L (ref 3.6–5.5)
PROT SERPL-MCNC: 7.4 G/DL (ref 6–8.2)
RBC # BLD AUTO: 4.48 M/UL (ref 4.2–5.4)
SODIUM SERPL-SCNC: 141 MMOL/L (ref 135–145)
WBC # BLD AUTO: 5.7 K/UL (ref 4.8–10.8)

## 2018-02-09 PROCEDURE — 76700 US EXAM ABDOM COMPLETE: CPT

## 2018-02-09 PROCEDURE — 36415 COLL VENOUS BLD VENIPUNCTURE: CPT

## 2018-02-09 PROCEDURE — 85025 COMPLETE CBC W/AUTO DIFF WBC: CPT

## 2018-02-09 PROCEDURE — 80053 COMPREHEN METABOLIC PANEL: CPT

## 2018-03-13 ENCOUNTER — TELEPHONE (OUTPATIENT)
Dept: NEUROLOGY | Facility: MEDICAL CENTER | Age: 23
End: 2018-03-13

## 2018-03-13 ENCOUNTER — APPOINTMENT (OUTPATIENT)
Dept: RADIOLOGY | Facility: MEDICAL CENTER | Age: 23
End: 2018-03-13
Attending: EMERGENCY MEDICINE
Payer: COMMERCIAL

## 2018-03-13 ENCOUNTER — HOSPITAL ENCOUNTER (EMERGENCY)
Facility: MEDICAL CENTER | Age: 23
End: 2018-03-13
Attending: EMERGENCY MEDICINE
Payer: COMMERCIAL

## 2018-03-13 VITALS
DIASTOLIC BLOOD PRESSURE: 51 MMHG | HEART RATE: 82 BPM | OXYGEN SATURATION: 96 % | TEMPERATURE: 97.9 F | RESPIRATION RATE: 16 BRPM | HEIGHT: 62 IN | SYSTOLIC BLOOD PRESSURE: 117 MMHG | WEIGHT: 205 LBS | BODY MASS INDEX: 37.73 KG/M2

## 2018-03-13 DIAGNOSIS — G40.909 SEIZURE DISORDER (HCC): ICD-10-CM

## 2018-03-13 DIAGNOSIS — R56.9 SEIZURE (HCC): ICD-10-CM

## 2018-03-13 LAB
ALBUMIN SERPL BCP-MCNC: 4.1 G/DL (ref 3.2–4.9)
ALBUMIN/GLOB SERPL: 1.1 G/DL
ALP SERPL-CCNC: 51 U/L (ref 30–99)
ALT SERPL-CCNC: 17 U/L (ref 2–50)
ANION GAP SERPL CALC-SCNC: 7 MMOL/L (ref 0–11.9)
APPEARANCE UR: ABNORMAL
AST SERPL-CCNC: 23 U/L (ref 12–45)
BACTERIA #/AREA URNS HPF: NEGATIVE /HPF
BASOPHILS # BLD AUTO: 0.4 % (ref 0–1.8)
BASOPHILS # BLD: 0.03 K/UL (ref 0–0.12)
BILIRUB SERPL-MCNC: 0.3 MG/DL (ref 0.1–1.5)
BILIRUB UR QL STRIP.AUTO: NEGATIVE
BUN SERPL-MCNC: 16 MG/DL (ref 8–22)
CALCIUM SERPL-MCNC: 9 MG/DL (ref 8.5–10.5)
CHLORIDE SERPL-SCNC: 106 MMOL/L (ref 96–112)
CO2 SERPL-SCNC: 24 MMOL/L (ref 20–33)
COLOR UR: YELLOW
CREAT SERPL-MCNC: 0.68 MG/DL (ref 0.5–1.4)
CULTURE IF INDICATED INDCX: NO UA CULTURE
EKG IMPRESSION: NORMAL
EOSINOPHIL # BLD AUTO: 0.07 K/UL (ref 0–0.51)
EOSINOPHIL NFR BLD: 0.9 % (ref 0–6.9)
EPI CELLS #/AREA URNS HPF: NEGATIVE /HPF
ERYTHROCYTE [DISTWIDTH] IN BLOOD BY AUTOMATED COUNT: 40.1 FL (ref 35.9–50)
GLOBULIN SER CALC-MCNC: 3.9 G/DL (ref 1.9–3.5)
GLUCOSE SERPL-MCNC: 80 MG/DL (ref 65–99)
GLUCOSE UR STRIP.AUTO-MCNC: NEGATIVE MG/DL
HCG SERPL QL: NEGATIVE
HCT VFR BLD AUTO: 43 % (ref 37–47)
HGB BLD-MCNC: 14.1 G/DL (ref 12–16)
HYALINE CASTS #/AREA URNS LPF: ABNORMAL /LPF
IMM GRANULOCYTES # BLD AUTO: 0.01 K/UL (ref 0–0.11)
IMM GRANULOCYTES NFR BLD AUTO: 0.1 % (ref 0–0.9)
KETONES UR STRIP.AUTO-MCNC: NEGATIVE MG/DL
LEUKOCYTE ESTERASE UR QL STRIP.AUTO: NEGATIVE
LYMPHOCYTES # BLD AUTO: 2.75 K/UL (ref 1–4.8)
LYMPHOCYTES NFR BLD: 37.3 % (ref 22–41)
MAGNESIUM SERPL-MCNC: 2.2 MG/DL (ref 1.5–2.5)
MCH RBC QN AUTO: 30.2 PG (ref 27–33)
MCHC RBC AUTO-ENTMCNC: 32.8 G/DL (ref 33.6–35)
MCV RBC AUTO: 92.1 FL (ref 81.4–97.8)
MICRO URNS: ABNORMAL
MONOCYTES # BLD AUTO: 0.72 K/UL (ref 0–0.85)
MONOCYTES NFR BLD AUTO: 9.8 % (ref 0–13.4)
NEUTROPHILS # BLD AUTO: 3.8 K/UL (ref 2–7.15)
NEUTROPHILS NFR BLD: 51.5 % (ref 44–72)
NITRITE UR QL STRIP.AUTO: NEGATIVE
NRBC # BLD AUTO: 0 K/UL
NRBC BLD-RTO: 0 /100 WBC
PH UR STRIP.AUTO: 5 [PH]
PLATELET # BLD AUTO: 140 K/UL (ref 164–446)
PMV BLD AUTO: 11.5 FL (ref 9–12.9)
POTASSIUM SERPL-SCNC: 4.8 MMOL/L (ref 3.6–5.5)
PROT SERPL-MCNC: 8 G/DL (ref 6–8.2)
PROT UR QL STRIP: NEGATIVE MG/DL
RBC # BLD AUTO: 4.67 M/UL (ref 4.2–5.4)
RBC # URNS HPF: ABNORMAL /HPF
RBC UR QL AUTO: NEGATIVE
SODIUM SERPL-SCNC: 137 MMOL/L (ref 135–145)
SP GR UR STRIP.AUTO: 1.03
UROBILINOGEN UR STRIP.AUTO-MCNC: 0.2 MG/DL
WBC # BLD AUTO: 7.4 K/UL (ref 4.8–10.8)
WBC #/AREA URNS HPF: ABNORMAL /HPF

## 2018-03-13 PROCEDURE — 93005 ELECTROCARDIOGRAM TRACING: CPT | Performed by: EMERGENCY MEDICINE

## 2018-03-13 PROCEDURE — 71045 X-RAY EXAM CHEST 1 VIEW: CPT

## 2018-03-13 PROCEDURE — 84703 CHORIONIC GONADOTROPIN ASSAY: CPT

## 2018-03-13 PROCEDURE — 99285 EMERGENCY DEPT VISIT HI MDM: CPT

## 2018-03-13 PROCEDURE — 700102 HCHG RX REV CODE 250 W/ 637 OVERRIDE(OP): Performed by: EMERGENCY MEDICINE

## 2018-03-13 PROCEDURE — 96374 THER/PROPH/DIAG INJ IV PUSH: CPT

## 2018-03-13 PROCEDURE — 80053 COMPREHEN METABOLIC PANEL: CPT

## 2018-03-13 PROCEDURE — 83735 ASSAY OF MAGNESIUM: CPT

## 2018-03-13 PROCEDURE — A9270 NON-COVERED ITEM OR SERVICE: HCPCS | Performed by: EMERGENCY MEDICINE

## 2018-03-13 PROCEDURE — 700111 HCHG RX REV CODE 636 W/ 250 OVERRIDE (IP): Performed by: EMERGENCY MEDICINE

## 2018-03-13 PROCEDURE — 81001 URINALYSIS AUTO W/SCOPE: CPT

## 2018-03-13 PROCEDURE — 85025 COMPLETE CBC W/AUTO DIFF WBC: CPT

## 2018-03-13 PROCEDURE — 36415 COLL VENOUS BLD VENIPUNCTURE: CPT

## 2018-03-13 RX ORDER — ONDANSETRON 4 MG/1
4 TABLET, ORALLY DISINTEGRATING ORAL ONCE
Status: COMPLETED | OUTPATIENT
Start: 2018-03-13 | End: 2018-03-13

## 2018-03-13 RX ORDER — IBUPROFEN 600 MG/1
600 TABLET ORAL ONCE
Status: COMPLETED | OUTPATIENT
Start: 2018-03-13 | End: 2018-03-13

## 2018-03-13 RX ORDER — LORAZEPAM 2 MG/ML
0.5 INJECTION INTRAMUSCULAR ONCE
Status: COMPLETED | OUTPATIENT
Start: 2018-03-13 | End: 2018-03-13

## 2018-03-13 RX ADMIN — LORAZEPAM 0.5 MG: 2 INJECTION INTRAMUSCULAR; INTRAVENOUS at 10:18

## 2018-03-13 RX ADMIN — IBUPROFEN 600 MG: 600 TABLET, FILM COATED ORAL at 10:19

## 2018-03-13 RX ADMIN — ONDANSETRON 4 MG: 4 TABLET, ORALLY DISINTEGRATING ORAL at 10:18

## 2018-03-13 ASSESSMENT — LIFESTYLE VARIABLES
HOW MANY TIMES IN THE PAST YEAR HAVE YOU HAD 5 OR MORE DRINKS IN A DAY: 0
HAVE YOU EVER FELT YOU SHOULD CUT DOWN ON YOUR DRINKING: NO
DO YOU DRINK ALCOHOL: YES
HAVE PEOPLE ANNOYED YOU BY CRITICIZING YOUR DRINKING: NO
TOTAL SCORE: 0
TOTAL SCORE: 0
AVERAGE NUMBER OF DAYS PER WEEK YOU HAVE A DRINK CONTAINING ALCOHOL: 2
TOTAL SCORE: 0
CONSUMPTION TOTAL: NEGATIVE
ON A TYPICAL DAY WHEN YOU DRINK ALCOHOL HOW MANY DRINKS DO YOU HAVE: 1
EVER FELT BAD OR GUILTY ABOUT YOUR DRINKING: NO
EVER HAD A DRINK FIRST THING IN THE MORNING TO STEADY YOUR NERVES TO GET RID OF A HANGOVER: NO

## 2018-03-13 ASSESSMENT — PAIN SCALES - GENERAL
PAINLEVEL_OUTOF10: 0
PAINLEVEL_OUTOF10: 0

## 2018-03-13 NOTE — ED NOTES
Pt had one episode of emesis, light tan in color.  She is requesting ibuprofen for neck pain.  Awaiting ERP.

## 2018-03-13 NOTE — ED NOTES
Provided written AVS instructions t pt regarding need for F/U with neurologists Dr. Malik and seizure precautions.  She verbalized understanding. And plans to visit Dr. Malik's office directly after leaving ED.  IV removed.  Pt ambulated out of ED in company of her Mom.

## 2018-03-13 NOTE — ED PROVIDER NOTES
ED Provider Note    CHIEF COMPLAINT  Chief Complaint   Patient presents with   • Seizure     witnessed seizure at work BIB ambulance.  Demonstrating some repetitive conversation per [facility]       HPI  Laura Rosario is a 22 y.o. female who presents after having a seizure. She has a known seizure disorder and is on Keppra. She takes 3 750 mg tablets of Keppra tonight and to Keppra 750 mg tablets in the morning. She took her medication at about 7:30 this morning. She reports that she hasn't been sleeping well at all over the last week. Denies any other stressors or illness. When she got to work she collapsed had a generalized tonic-clonic seizure. Lasted a few minutes. Resolved spontaneously. She was confused afterwards. She bit her tongue. She complains of headache and some neck pain as she has in the past. This is mild. No weakness numbness neurologic symptoms. No abdominal pain, nausea vomiting or diarrhea. No fevers. No URI symptoms    REVIEW OF SYSTEMS  As per HPI, otherwise a 10 point review of systems is negative    PAST MEDICAL HISTORY  Past Medical History:   Diagnosis Date   • Pneumonia 2011   • Seizure disorder ([facility])        SOCIAL HISTORY  Social History   Substance Use Topics   • Smoking status: Never Smoker   • Smokeless tobacco: Never Used   • Alcohol use 0.0 oz/week      Comment: Occasionally       SURGICAL HISTORY  Past Surgical History:   Procedure Laterality Date   • TONSILLECTOMY AND ADENOIDECTOMY Bilateral 1/8/2016    Procedure: TONSILLECTOMY AND ADENOIDECTOMY;  Surgeon: Esme Garcia M.D.;  Location: SURGERY [facility];  Service:    • DENTAL EXTRACTION(S)  7/2015    wisdom teeth       CURRENT MEDICATIONS  Home Medications     Reviewed by Joana Escobedo R.N. (Registered Nurse) on 03/13/18 at 0907  Med List Status: Partial   Medication Last Dose Status   Cyanocobalamin (B-12 PO) 8/28/2017 Active   ibuprofen (MOTRIN) 800 MG Tab prn Active   Levetiracetam 750 MG TABLET SR 24 HR   "Active   lorazepam (ATIVAN) 1 MG Tab prn Active   Multiple Vitamin (MULTI VITAMIN DAILY PO) 8/28/2017 Active   TRINESSA, 28, 0.18/0.215/0.25 MG-35 MCG Tab 8/28/2017 Active   vitamin D (CHOLECALCIFEROL) 1000 UNIT Tab 8/28/2017 Active                ALLERGIES  No Known Allergies    PHYSICAL EXAM  VITAL SIGNS: /51   Pulse 90   Temp 36.6 °C (97.9 °F)   Resp 16   Ht 1.575 m (5' 2\")   Wt 93 kg (205 lb)   LMP 02/25/2018 (Approximate)   SpO2 96%   BMI 37.49 kg/m²    Constitutional: Awake and alert  HENT:  Atraumatic, Normocephalic.Oropharynx dry mucus membranes, Nose normal inspection.   Eyes: Normal inspection  Neck: Supple  Cardiovascular: Normal heart rate, Normal rhythm.  Symmetric peripheral pulses.   Thorax & Lungs: No respiratory distress, No wheezing, No rales, No rhonchi, No chest tenderness.   Abdomen: Bowel sounds normal, soft, non-distended, nontender, no mass  Skin: Warm, Dry, No rash.   Back: No tenderness, No CVA tenderness.   Extremities: No clubbing, cyanosis, edema, no Homans or cords   Neurologic: Grossly normal   Psychiatric: Anxious appearing    RADIOLOGY/PROCEDURES  DX-CHEST-PORTABLE (1 VIEW)   Final Result      No acute cardiopulmonary process is seen.         Imaging is interpreted by radiologist    Labs:  Results for orders placed or performed during the hospital encounter of 03/13/18   CBC WITH DIFFERENTIAL   Result Value Ref Range    WBC 7.4 4.8 - 10.8 K/uL    RBC 4.67 4.20 - 5.40 M/uL    Hemoglobin 14.1 12.0 - 16.0 g/dL    Hematocrit 43.0 37.0 - 47.0 %    MCV 92.1 81.4 - 97.8 fL    MCH 30.2 27.0 - 33.0 pg    MCHC 32.8 (L) 33.6 - 35.0 g/dL    RDW 40.1 35.9 - 50.0 fL    Platelet Count 140 (L) 164 - 446 K/uL    MPV 11.5 9.0 - 12.9 fL    Neutrophils-Polys 51.50 44.00 - 72.00 %    Lymphocytes 37.30 22.00 - 41.00 %    Monocytes 9.80 0.00 - 13.40 %    Eosinophils 0.90 0.00 - 6.90 %    Basophils 0.40 0.00 - 1.80 %    Immature Granulocytes 0.10 0.00 - 0.90 %    Nucleated RBC 0.00 /100 WBC    " Neutrophils (Absolute) 3.80 2.00 - 7.15 K/uL    Lymphs (Absolute) 2.75 1.00 - 4.80 K/uL    Monos (Absolute) 0.72 0.00 - 0.85 K/uL    Eos (Absolute) 0.07 0.00 - 0.51 K/uL    Baso (Absolute) 0.03 0.00 - 0.12 K/uL    Immature Granulocytes (abs) 0.01 0.00 - 0.11 K/uL    NRBC (Absolute) 0.00 K/uL   COMP METABOLIC PANEL   Result Value Ref Range    Sodium 137 135 - 145 mmol/L    Potassium 4.8 3.6 - 5.5 mmol/L    Chloride 106 96 - 112 mmol/L    Co2 24 20 - 33 mmol/L    Anion Gap 7.0 0.0 - 11.9    Glucose 80 65 - 99 mg/dL    Bun 16 8 - 22 mg/dL    Creatinine 0.68 0.50 - 1.40 mg/dL    Calcium 9.0 8.5 - 10.5 mg/dL    AST(SGOT) 23 12 - 45 U/L    ALT(SGPT) 17 2 - 50 U/L    Alkaline Phosphatase 51 30 - 99 U/L    Total Bilirubin 0.3 0.1 - 1.5 mg/dL    Albumin 4.1 3.2 - 4.9 g/dL    Total Protein 8.0 6.0 - 8.2 g/dL    Globulin 3.9 (H) 1.9 - 3.5 g/dL    A-G Ratio 1.1 g/dL   HCG Qual Serum   Result Value Ref Range    Beta-Hcg Qualitative Serum Negative Negative   MAGNESIUM   Result Value Ref Range    Magnesium 2.2 1.5 - 2.5 mg/dL   URINALYSIS,CULTURE IF INDICATED   Result Value Ref Range    Color Yellow     Character Cloudy (A)     Specific Gravity 1.032 <1.035    Ph 5.0 5.0 - 8.0    Glucose Negative Negative mg/dL    Ketones Negative Negative mg/dL    Protein Negative Negative mg/dL    Bilirubin Negative Negative    Urobilinogen, Urine 0.2 Negative    Nitrite Negative Negative    Leukocyte Esterase Negative Negative    Occult Blood Negative Negative    Micro Urine Req Microscopic     Culture Indicated No UA Culture   ESTIMATED GFR   Result Value Ref Range    GFR If African American >60 >60 mL/min/1.73 m 2    GFR If Non African American >60 >60 mL/min/1.73 m 2   URINE MICROSCOPIC (W/UA)   Result Value Ref Range    WBC 0-2 /hpf    RBC 5-10 (A) /hpf    Bacteria Negative None /hpf    Epithelial Cells Negative /hpf    Hyaline Cast 0-2 /lpf   EKG (ER)   Result Value Ref Range    Report       [facility] Emergency  Dept.    Test Date:  2018  Pt Name:    JAMAICA ORO                 Department: ER  MRN:        [id number]                      Room:       BL 21  Gender:     Female                       Technician: [id number]  :        1995                   Requested By:TRAVIS FLANAGAN  Order #:    [id number]                    Reading MD: TRAVIS FLANAGAN MD    Measurements  Intervals                                Axis  Rate:       81                           P:          21  NH:         148                          QRS:        25  QRSD:       88                           T:          25  QT:         376  QTc:        437    Interpretive Statements  SINUS RHYTHM  No previous ECG available for comparison    Electronically Signed On 3- 14:08:11 PDT by TRAVIS FLANAGAN MD           COURSE & MEDICAL DECISION MAKING  Patient presents to ER with recurrent seizure. She appears well on exam. Data was obtained and was reassuring. No source of lowered seizure threshold was identified. She was given 0.5 mg of Ativan intravenously. She felt nauseous and was given Zofran orally. She was given ibuprofen orally. Her neck pain and headache resolved. Repeat exam was benign. Observed over several hours. No new symptoms.    Discussed with the patient's neurologist, Lisa Malik. She would like to keep the patient's medications the same for now. A repeat Keppra trough will be ordered. Patient will be seen in the office. Patient does have Ativan prescription if she has any prodrome. Given precautions regarding seizure and activities to avoid. She'll return to the ER for recurrent seizures or concern.      FINAL IMPRESSION  1. Seizure      This dictation was created using voice recognition software. The accuracy of the dictation is limited to the abilities of the software.  The nursing notes were reviewed and certain aspects of this information were incorporated into this note.      Electronically signed by: Travis Flanagan, 3/13/2018  2:06 PM

## 2018-03-13 NOTE — ED TRIAGE NOTES
Chief Complaint   Patient presents with   • Seizure     witnessed seizure at work BIB ambulance.  Demonstrating some repetitive conversation per JOSE     Pt conversant upon arrival to ED.  She reports having taken her Keppra this am.

## 2018-03-13 NOTE — DISCHARGE INSTRUCTIONS
Driving and Equipment Restrictions  Some medical problems make it dangerous to drive, ride a bike, or use machines. Some of these problems are:  · A hard blow to the head (concussion).  · Passing out (fainting).  · Twitching and shaking (seizures).  · Low blood sugar.  · Taking medicine to help you relax (sedatives).  · Taking pain medicines.  · Wearing an eye patch.  · Wearing splints. This can make it hard to use parts of your body that you need to drive safely.  HOME CARE   · Do not drive until your doctor says it is okay.  · Do not use machines until your doctor says it is okay.  You may need a form signed by your doctor (medical release) before you can drive again. You may also need this form before you do other tasks where you need to be fully alert.  MAKE SURE YOU:  · Understand these instructions.  · Will watch your condition.  · Will get help right away if you are not doing well or get worse.     This information is not intended to replace advice given to you by your health care provider. Make sure you discuss any questions you have with your health care provider.     Document Released: 01/25/2006 Document Revised: 03/11/2013 Document Reviewed: 04/27/2011  [employer] Interactive Patient Education ©2016 [employer]

## 2018-03-13 NOTE — TELEPHONE ENCOUNTER
Patient scheduled for a follow up appointment 3/21/18 @ 9:20a.m.     Explained to patient that we needed labs, fasting and tough.

## 2018-03-13 NOTE — ED TRIAGE NOTES
Pt reports upon arrival to ED that she had 2 seizures 2 years ago, but has not had one until today.

## 2018-03-16 ENCOUNTER — HOSPITAL ENCOUNTER (OUTPATIENT)
Dept: LAB | Facility: MEDICAL CENTER | Age: 23
End: 2018-03-16
Attending: NURSE PRACTITIONER
Payer: COMMERCIAL

## 2018-03-16 DIAGNOSIS — G40.909 SEIZURE DISORDER (HCC): ICD-10-CM

## 2018-03-16 LAB — 25(OH)D3 SERPL-MCNC: 19 NG/ML (ref 30–100)

## 2018-03-16 PROCEDURE — 36415 COLL VENOUS BLD VENIPUNCTURE: CPT

## 2018-03-16 PROCEDURE — 80177 DRUG SCRN QUAN LEVETIRACETAM: CPT

## 2018-03-16 PROCEDURE — 82306 VITAMIN D 25 HYDROXY: CPT

## 2018-03-17 LAB — LEVETIRACETAM SERPL-MCNC: 35 UG/ML (ref 12–46)

## 2018-03-19 ENCOUNTER — TELEPHONE (OUTPATIENT)
Dept: NEUROLOGY | Facility: MEDICAL CENTER | Age: 23
End: 2018-03-19

## 2018-03-21 ENCOUNTER — OFFICE VISIT (OUTPATIENT)
Dept: NEUROLOGY | Facility: MEDICAL CENTER | Age: 23
End: 2018-03-21
Payer: COMMERCIAL

## 2018-03-21 VITALS
BODY MASS INDEX: 39.31 KG/M2 | RESPIRATION RATE: 16 BRPM | TEMPERATURE: 97.9 F | OXYGEN SATURATION: 97 % | SYSTOLIC BLOOD PRESSURE: 110 MMHG | WEIGHT: 213.63 LBS | DIASTOLIC BLOOD PRESSURE: 68 MMHG | HEART RATE: 60 BPM | HEIGHT: 62 IN

## 2018-03-21 DIAGNOSIS — G40.909 SEIZURE DISORDER (HCC): ICD-10-CM

## 2018-03-21 DIAGNOSIS — G43.009 MIGRAINE WITHOUT AURA AND WITHOUT STATUS MIGRAINOSUS, NOT INTRACTABLE: ICD-10-CM

## 2018-03-21 PROCEDURE — 99213 OFFICE O/P EST LOW 20 MIN: CPT | Performed by: NURSE PRACTITIONER

## 2018-03-21 ASSESSMENT — ENCOUNTER SYMPTOMS
ABDOMINAL PAIN: 0
LOSS OF CONSCIOUSNESS: 1
VOMITING: 0
NERVOUS/ANXIOUS: 0
HEADACHES: 0
SORE THROAT: 0
DOUBLE VISION: 0
DIARRHEA: 0
NAUSEA: 0
MUSCULOSKELETAL NEGATIVE: 1
DEPRESSION: 0
CONSTITUTIONAL NEGATIVE: 1
COUGH: 0

## 2018-03-21 ASSESSMENT — PATIENT HEALTH QUESTIONNAIRE - PHQ9: CLINICAL INTERPRETATION OF PHQ2 SCORE: 0

## 2018-03-21 NOTE — PROGRESS NOTES
Subjective:      Laura Rosario is a 22 y.o. female who presents with Follow-Up (Seizure disorder)      Here with mother today.    HPI  3/13/18, went to work and was feeling hungry.  She went to the clocking in/out machine.  She felt dizzy and helped herself hold onto the wall and lowered herself to the floor. She was minimally responsive.  She was opening the store at the time.  She was transported into the ED.    Otherwise she was able to go back to work the next day.  She recovered quickly.    Has been healthy in general.    Working full-time, just got engaged and bought a home.    PCP yearly had told her that she was healthy last week.     Ref. Range 3/16/2018 08:35   Keppra Latest Ref Range: 12 - 46 ug/mL 35       Current Outpatient Prescriptions   Medication Sig Dispense Refill   • ibuprofen (MOTRIN) 800 MG Tab TK 1 T PO Q 8 H PRF ORTHOPEDIC PAIN OR HEADACHE  4   • TRINESSA, 28, 0.18/0.215/0.25 MG-35 MCG Tab      • Levetiracetam 750 MG TABLET SR 24 HR Take 2 tablets by mouth every morning and 3 tablets po qhs. 150 Tab 11   • lorazepam (ATIVAN) 1 MG Tab Take 1/2-1 tablet PO PRN breakthrough seizures.  Do not exceed more than 2 tablets in 24 hours unless directed otherwise by physician. 20 Tab 0   • vitamin D (CHOLECALCIFEROL) 1000 UNIT Tab Take 1,000 Units by mouth every day.     • Cyanocobalamin (B-12 PO) Take  by mouth.       No current facility-administered medications for this visit.        Review of Systems   Constitutional: Negative.    HENT: Negative for hearing loss, nosebleeds and sore throat.         No recent head injury.   Eyes: Negative for double vision.        No new loss of vision.   Respiratory: Negative for cough.         No recent lung infections.   Cardiovascular: Negative for chest pain.   Gastrointestinal: Negative for abdominal pain, diarrhea, nausea and vomiting.   Genitourinary: Negative.    Musculoskeletal: Negative.    Skin: Negative.    Neurological: Positive for loss of  "consciousness. Negative for headaches.   Endo/Heme/Allergies:        No history of endocrine dysfunction.  No new problems.   Psychiatric/Behavioral: Negative for depression. The patient is not nervous/anxious.         No recent mood changes.          Objective:     /68   Pulse 60   Temp 36.6 °C (97.9 °F)   Resp 16   Ht 1.575 m (5' 2\")   Wt 96.9 kg (213 lb 10 oz)   LMP 02/25/2018 (Approximate)   SpO2 97%   BMI 39.07 kg/m²      Physical Exam   Constitutional: She is oriented to person, place, and time. She appears well-developed and well-nourished.   HENT:   Head: Normocephalic and atraumatic.   Eyes: Pupils are equal, round, and reactive to light.   Neck: Normal range of motion.   Cardiovascular: Normal rate and regular rhythm.    Pulmonary/Chest: Effort normal.   Musculoskeletal: Normal range of motion.   Neurological: She is alert and oriented to person, place, and time. She exhibits normal muscle tone. Gait normal.   No observable changes in neurologic status.  See initial new patient examination for details.    Skin: Skin is warm.   Psychiatric: She has a normal mood and affect.           Assessment/Plan:     Seizure disorder, probable generalized epilepsy:  Last seizure occurred August 5, 2016.  Currently taking LEV XR 1500-2250mg.  No further concerns for seizures in one year now.       Ref. Range 2/13/2017 08:42   Keppra Latest Ref Range: 12-46 ug/mL 35      Headaches:  Occuring rarely and relieved effectively with NSAID's.     Obtain VEEG as a comparison study prior to next appointment.     Needs to be taking a MVI, Calcium 1000mg and Vit D3 1000IU per day.  Needs folic acid 1mg per day.  Needs to continue at least one form of birth control.  Continue Vit B6 100mg per day for irritability.     Counseled regarding cautious driving, particularly avoiding night time driving.     Return for follow-up in 6 months.   I spent 35 minutes with this patient, over fifty percent was spent counseling " patient on their condition, best management practices, reviewing test results and risks and benefits of treatment.

## 2018-06-12 ENCOUNTER — TELEPHONE (OUTPATIENT)
Dept: NEUROLOGY | Facility: MEDICAL CENTER | Age: 23
End: 2018-06-12

## 2018-06-12 DIAGNOSIS — G40.909 SEIZURE DISORDER (HCC): ICD-10-CM

## 2018-06-14 ENCOUNTER — NON-PROVIDER VISIT (OUTPATIENT)
Dept: NEUROLOGY | Facility: MEDICAL CENTER | Age: 23
End: 2018-06-14
Payer: COMMERCIAL

## 2018-06-14 ENCOUNTER — HOSPITAL ENCOUNTER (OUTPATIENT)
Dept: LAB | Facility: MEDICAL CENTER | Age: 23
End: 2018-06-14
Attending: INTERNAL MEDICINE
Payer: COMMERCIAL

## 2018-06-14 DIAGNOSIS — G40.909 SEIZURE DISORDER (HCC): ICD-10-CM

## 2018-06-14 LAB
ALBUMIN SERPL BCP-MCNC: 4.3 G/DL (ref 3.2–4.9)
ALBUMIN/GLOB SERPL: 1 G/DL
ALP SERPL-CCNC: 51 U/L (ref 30–99)
ALT SERPL-CCNC: 28 U/L (ref 2–50)
ANION GAP SERPL CALC-SCNC: 9 MMOL/L (ref 0–11.9)
AST SERPL-CCNC: 20 U/L (ref 12–45)
BASOPHILS # BLD AUTO: 0.6 % (ref 0–1.8)
BASOPHILS # BLD: 0.04 K/UL (ref 0–0.12)
BILIRUB SERPL-MCNC: 0.3 MG/DL (ref 0.1–1.5)
BUN SERPL-MCNC: 14 MG/DL (ref 8–22)
CALCIUM SERPL-MCNC: 9 MG/DL (ref 8.5–10.5)
CHLORIDE SERPL-SCNC: 108 MMOL/L (ref 96–112)
CHOLEST SERPL-MCNC: 168 MG/DL (ref 100–199)
CO2 SERPL-SCNC: 22 MMOL/L (ref 20–33)
CREAT SERPL-MCNC: 0.68 MG/DL (ref 0.5–1.4)
EOSINOPHIL # BLD AUTO: 0.11 K/UL (ref 0–0.51)
EOSINOPHIL NFR BLD: 1.7 % (ref 0–6.9)
ERYTHROCYTE [DISTWIDTH] IN BLOOD BY AUTOMATED COUNT: 41.1 FL (ref 35.9–50)
EST. AVERAGE GLUCOSE BLD GHB EST-MCNC: 105 MG/DL
GLOBULIN SER CALC-MCNC: 4.1 G/DL (ref 1.9–3.5)
GLUCOSE SERPL-MCNC: 83 MG/DL (ref 65–99)
HBA1C MFR BLD: 5.3 % (ref 0–5.6)
HCT VFR BLD AUTO: 42.5 % (ref 37–47)
HDLC SERPL-MCNC: 41 MG/DL
HGB BLD-MCNC: 13.9 G/DL (ref 12–16)
IMM GRANULOCYTES # BLD AUTO: 0.01 K/UL (ref 0–0.11)
IMM GRANULOCYTES NFR BLD AUTO: 0.2 % (ref 0–0.9)
LDLC SERPL CALC-MCNC: 92 MG/DL
LYMPHOCYTES # BLD AUTO: 2.68 K/UL (ref 1–4.8)
LYMPHOCYTES NFR BLD: 40.6 % (ref 22–41)
MCH RBC QN AUTO: 31 PG (ref 27–33)
MCHC RBC AUTO-ENTMCNC: 32.7 G/DL (ref 33.6–35)
MCV RBC AUTO: 94.9 FL (ref 81.4–97.8)
MONOCYTES # BLD AUTO: 0.58 K/UL (ref 0–0.85)
MONOCYTES NFR BLD AUTO: 8.8 % (ref 0–13.4)
NEUTROPHILS # BLD AUTO: 3.18 K/UL (ref 2–7.15)
NEUTROPHILS NFR BLD: 48.1 % (ref 44–72)
NRBC # BLD AUTO: 0 K/UL
NRBC BLD-RTO: 0 /100 WBC
PLATELET # BLD AUTO: 166 K/UL (ref 164–446)
PMV BLD AUTO: 12.1 FL (ref 9–12.9)
POTASSIUM SERPL-SCNC: 3.9 MMOL/L (ref 3.6–5.5)
PROT SERPL-MCNC: 8.4 G/DL (ref 6–8.2)
RBC # BLD AUTO: 4.48 M/UL (ref 4.2–5.4)
SODIUM SERPL-SCNC: 139 MMOL/L (ref 135–145)
TRIGL SERPL-MCNC: 173 MG/DL (ref 0–149)
WBC # BLD AUTO: 6.6 K/UL (ref 4.8–10.8)

## 2018-06-14 PROCEDURE — 95819 EEG AWAKE AND ASLEEP: CPT | Performed by: PSYCHIATRY & NEUROLOGY

## 2018-06-14 PROCEDURE — 36415 COLL VENOUS BLD VENIPUNCTURE: CPT

## 2018-06-14 PROCEDURE — 80061 LIPID PANEL: CPT

## 2018-06-14 PROCEDURE — 85025 COMPLETE CBC W/AUTO DIFF WBC: CPT

## 2018-06-14 PROCEDURE — 80053 COMPREHEN METABOLIC PANEL: CPT

## 2018-06-14 PROCEDURE — 83036 HEMOGLOBIN GLYCOSYLATED A1C: CPT

## 2018-06-15 ENCOUNTER — TELEPHONE (OUTPATIENT)
Dept: NEUROLOGY | Facility: MEDICAL CENTER | Age: 23
End: 2018-06-15

## 2018-06-15 NOTE — PROCEDURES
ROUTINE VIDEO ELECTROENCEPHALOGRAM REPORT     Referring provider: MARYSOL Rain.     DOS: 6/14/2018 (total recording for 34 minutes).      INDICATION:  Laura Rosario 22 y.o. female presenting with history of seizures and recent spell of dizziness.      CURRENT ANTIEPILEPTIC REGIMEN: Levetiracetam.      TECHNIQUE: 30 channel routine electroencephalogram (EEG) was performed in accordance with the international 10-20 system. The study was reviewed in bipolar and referential montages. The recording examined the patient during wakeful and drowsy/sleep state(s).      DESCRIPTION OF THE RECORD:  During the wakefulness, the background showed a symmetrical 10 Hz alpha activity posteriorly with amplitude of 70 mV.  There was reactivity to eye closure/opening.  A normal anterior-posterior gradient was noted with faster beta frequencies seen anteriorly.  During drowsiness, high-amplitude delta frequencies were seen.     During the sleep state, background shows diffuse high-amplitude 4-5 Hz delta activity.  Symmetrical high-amplitude sleep spindles and vertex sharps were seen in the leads over the central regions.      ACTIVATION PROCEDURES:    Intermittent Photic stimulation was performed in a stepwise fashion from 1 to 30 Hz and elicited a normal response (photic driving), most noticeable in the posterior leads.        ICTAL AND/OR INTERICTAL FINDINGS:    Rare bursts of generalized, poorly formed, 3-4 HZ spike and slow wave complexes. Rare left or right temporal sharps with intermittent slowing. No clinical events or seizures were reported or recorded during the study.      EKG: sampling of the EKG recording demonstrated sinus rhythm.       INTERPRETATION:  This is an abnormal routine EEG recording in the awake, drowsy, and sleep state(s).  Rare bursts of generalized, poorly formed, 3-4 HZ spike and slow wave complexes. Rare independent left and right temporal sharps with intermittent slowing. The findings may  suggest: 1)-A primary generalized epilepsy, with fragmented discharges, 2)-Mixed generalized and focal epilepsy (rare), or 3)-Multifocal epilepsy with fast generalization. Consider obtaining a prolonged study if clinically indicated. No typical events were captured during the study. Clinical and radiological correlation is recommended.        Jamil Almazan MD  Diplomate in Neurology, Epilepsy, and Electrodiagnostic Medicine.    WODO FISCHER    DD:  06/15/2018 08:57:33  DT:  06/15/2018 09:17:22    D#:  [id number]  Job#:  [id number]    cc: MAXWELL GREGG

## 2018-06-15 NOTE — TELEPHONE ENCOUNTER
Can we please bring her in for a sooner appt (than 10/30) for EEG results and medication management?  Maybe the week of Aug 6th?

## 2018-06-15 NOTE — PROGRESS NOTES
ROUTINE VIDEO ELECTROENCEPHALOGRAM REPORT     Referring provider: MARYSOL Rain.    DOS: 6/14/2018 (total recording for 34 minutes).      INDICATION:  Laura Rosario 22 y.o. female presenting with history of seizures and recent spell of dizziness.      CURRENT ANTIEPILEPTIC REGIMEN: Levetiracetam.      TECHNIQUE: 30 channel routine electroencephalogram (EEG) was performed in accordance with the international 10-20 system. The study was reviewed in bipolar and referential montages. The recording examined the patient during wakeful and drowsy/sleep state(s).      DESCRIPTION OF THE RECORD:  During the wakefulness, the background showed a symmetrical 10 Hz alpha activity posteriorly with amplitude of 70 mV.  There was reactivity to eye closure/opening.  A normal anterior-posterior gradient was noted with faster beta frequencies seen anteriorly.  During drowsiness, high-amplitude delta frequencies were seen.     During the sleep state, background shows diffuse high-amplitude 4-5 Hz delta activity.  Symmetrical high-amplitude sleep spindles and vertex sharps were seen in the leads over the central regions.      ACTIVATION PROCEDURES:    Intermittent Photic stimulation was performed in a stepwise fashion from 1 to 30 Hz and elicited a normal response (photic driving), most noticeable in the posterior leads.        ICTAL AND/OR INTERICTAL FINDINGS:    Rare bursts of generalized, poorly formed, 3-4 HZ spike and slow wave complexes. Rare left or right temporal sharps with intermittent slowing. No clinical events or seizures were reported or recorded during the study.      EKG: sampling of the EKG recording demonstrated sinus rhythm.       INTERPRETATION:  This is an abnormal routine EEG recording in the awake, drowsy, and sleep state(s).  Rare bursts of generalized, poorly formed, 3-4 HZ spike and slow wave complexes. Rare independent left and right temporal sharps with intermittent slowing. The findings may  suggest: 1)-A primary generalized epilepsy, with fragmented discharges, 2)-Mixed generalized and focal epilepsy (rare), or 3)-Multifocal epilepsy with fast generalization. Consider obtaining a prolonged study if clinically indicated. No typical events were captured during the study. Clinical and radiological correlation is recommended.        Jamil Almazan MD  Diplomate in Neurology, Epilepsy, and Electrodiagnostic Medicine.

## 2018-08-07 ENCOUNTER — OFFICE VISIT (OUTPATIENT)
Dept: NEUROLOGY | Facility: MEDICAL CENTER | Age: 23
End: 2018-08-07
Payer: COMMERCIAL

## 2018-08-07 VITALS
SYSTOLIC BLOOD PRESSURE: 106 MMHG | OXYGEN SATURATION: 96 % | WEIGHT: 215 LBS | RESPIRATION RATE: 18 BRPM | BODY MASS INDEX: 39.56 KG/M2 | DIASTOLIC BLOOD PRESSURE: 62 MMHG | HEART RATE: 68 BPM | TEMPERATURE: 98.1 F | HEIGHT: 62 IN

## 2018-08-07 DIAGNOSIS — G40.909 SEIZURE DISORDER (HCC): ICD-10-CM

## 2018-08-07 DIAGNOSIS — G43.009 MIGRAINE WITHOUT AURA AND WITHOUT STATUS MIGRAINOSUS, NOT INTRACTABLE: ICD-10-CM

## 2018-08-07 PROCEDURE — 99214 OFFICE O/P EST MOD 30 MIN: CPT | Performed by: NURSE PRACTITIONER

## 2018-08-07 RX ORDER — LORAZEPAM 1 MG/1
TABLET ORAL
Qty: 20 TAB | Refills: 0 | Status: SHIPPED | OUTPATIENT
Start: 2018-08-07 | End: 2018-09-07

## 2018-08-07 ASSESSMENT — ENCOUNTER SYMPTOMS
DIARRHEA: 0
VOMITING: 0
ABDOMINAL PAIN: 0
DEPRESSION: 0
CONSTITUTIONAL NEGATIVE: 1
HEADACHES: 0
MUSCULOSKELETAL NEGATIVE: 1
NAUSEA: 0
SEIZURES: 1
NERVOUS/ANXIOUS: 0
SORE THROAT: 0
COUGH: 0
DOUBLE VISION: 0

## 2018-08-07 NOTE — PROGRESS NOTES
"Subjective:      Laura Rosario is a 22 y.o. female who presents with Follow-Up (Seizure disorder)          HPI Here with mother today.    Working now at [employer].  Working Monday-Friday, 9am-6pm.    Beginning of May, she was going to the bathroom.    Remembered her head getting blood rushed and heard \"music in her ears\", felt her whole face seize up and remembered falling then lost consciousness.  She had pooped.  She did not remember feeling light-headed.  Felt a weird pressure on the left side of her head.  She later realized that she had hit her head and then was seen by PCP.    Had an episode when she was walking to the couch, walking downstairs.  Had felt light-headed.  Couldn't get her foot up on the foot rest.  Zoned out, lost a bit of time.  She felt sick and vomited in the car.  Had been up late that night, she was sleep deprived and was feeling tired.    Last week of July, she is being transported to work early. She was pouring coffee into her cup, ears started to ring.  Head felt really full with pressure and then felt her face starting to \"clamp up\" then walked over to the couch and it resolved.  Felt like she recovered well.    Friday night, 8/3/2018: Had a seizure in her sleep.  She woke-up around 4am and had bit her tongue.  Her sleep partner said she screamed and was laying face down in her pillow and he tried to turn her over, she was rigid.  He asked if she was okay and she kept saying that she was okay.    Has been feeling cognitively cleared since Friday's seizures.    Has noticed that some episodes have been occurring right before her period is to start.    Headaches are not occurring at all.    INTERPRETATION:  This is an abnormal routine EEG recording in the awake, drowsy, and sleep state(s).  Rare bursts of generalized, poorly formed, 3-4 HZ spike and slow wave complexes. Rare independent left and right temporal sharps with intermittent slowing. The findings may suggest: " 1)-A primary generalized epilepsy, with fragmented discharges, 2)-Mixed generalized and focal epilepsy (rare), or 3)-Multifocal epilepsy with fast generalization. Consider obtaining a prolonged study if clinically indicated. No typical events were captured during the study. Clinical and radiological correlation is recommended.    2016:   Impression       1.  Unremarkable MRI of the brain with and without contrast enhancement.  2.  Previously identified T2 signal abnormality in the left titus is not seen on today's study nor was it seen on the T2-weighted sequence performed 8/30/2016. Finding is consistent with artifact.        Ref. Range 3/16/2018 08:35   Keppra Latest Ref Range: 12 - 46 ug/mL 35       Current Outpatient Prescriptions   Medication Sig Dispense Refill   • ibuprofen (MOTRIN) 800 MG Tab TK 1 T PO Q 8 H PRF ORTHOPEDIC PAIN OR HEADACHE  4   • TRINESSA, 28, 0.18/0.215/0.25 MG-35 MCG Tab      • Levetiracetam 750 MG TABLET SR 24 HR Take 2 tablets by mouth every morning and 3 tablets po qhs. 150 Tab 11   • lorazepam (ATIVAN) 1 MG Tab Take 1/2-1 tablet PO PRN breakthrough seizures.  Do not exceed more than 2 tablets in 24 hours unless directed otherwise by physician. 20 Tab 0   • vitamin D (CHOLECALCIFEROL) 1000 UNIT Tab Take 1,000 Units by mouth every day.     • Cyanocobalamin (B-12 PO) Take  by mouth.       No current facility-administered medications for this visit.        Review of Systems   Constitutional: Negative.    HENT: Negative for hearing loss, nosebleeds and sore throat.         No recent head injury.   Eyes: Negative for double vision.        No new loss of vision.   Respiratory: Negative for cough.         No recent lung infections.   Cardiovascular: Negative for chest pain.   Gastrointestinal: Negative for abdominal pain, diarrhea, nausea and vomiting.   Genitourinary: Negative.    Musculoskeletal: Negative.    Skin: Negative.    Neurological: Positive for seizures. Negative for headaches.  "  Endo/Heme/Allergies:        No history of endocrine dysfunction.  No new problems.   Psychiatric/Behavioral: Negative for depression. The patient is not nervous/anxious.         No recent mood changes.          Objective:     /62   Pulse 68   Temp 36.7 °C (98.1 °F)   Resp 18   Ht 1.575 m (5' 2\")   Wt 97.5 kg (215 lb)   SpO2 96%   BMI 39.32 kg/m²      Physical Exam   Constitutional: She is oriented to person, place, and time. She appears well-developed and well-nourished. No distress.   HENT:   Head: Normocephalic and atraumatic.   Eyes: EOM are normal.   Neck: Normal range of motion.   Cardiovascular: Normal rate and regular rhythm.    Pulmonary/Chest: Effort normal.   Neurological: She is alert and oriented to person, place, and time. She exhibits normal muscle tone. Gait normal.   No observable changes in neurologic status.  See initial new patient examination for details.    Skin: Skin is warm.   Psychiatric: She has a normal mood and affect.             Assessment/Plan:     Seizure disorder, probable generalized epilepsy:  May 2018 through August 3rd she has had at least 4 episodes concerning for seizures.    8/3/2018 Nocturnal seizure, bit tongue, found face down in her pillow.    Headaches are no longer a concern.    Continue Leviteracetam ER 1500mg-2250mg.    Begin Fycompa with slow titration to goal dose of 8mg qhs.  Discussed benefits, usage and possible side effects with this medication including mood change.    Encouraged to established to see a OB/GYN.  Recommend that she start a birth control method in which she eliminates her period.    Return for follow-up in 2 months.   I spent 35+ minutes with this patient, over fifty percent was spent counseling patient on their condition, best management practices, reviewing test results and risks and benefits of treatment.    "

## 2018-08-15 ENCOUNTER — TELEPHONE (OUTPATIENT)
Dept: NEUROLOGY | Facility: MEDICAL CENTER | Age: 23
End: 2018-08-15

## 2018-08-15 NOTE — TELEPHONE ENCOUNTER
Patient phoned today, she states that her Fycompa has a copay of $900 is there a different medication that she can try?

## 2018-08-29 ENCOUNTER — TELEPHONE (OUTPATIENT)
Dept: NEUROLOGY | Facility: MEDICAL CENTER | Age: 23
End: 2018-08-29

## 2018-08-29 DIAGNOSIS — G40.909 SEIZURE DISORDER (HCC): ICD-10-CM

## 2018-08-29 RX ORDER — ZONISAMIDE 100 MG/1
CAPSULE ORAL
Qty: 180 CAP | Refills: 5 | Status: SHIPPED
Start: 2018-08-29 | End: 2019-03-11 | Stop reason: SDUPTHER

## 2018-08-29 NOTE — TELEPHONE ENCOUNTER
1) Brain MRI w/s contrast is ordered.    2) New start zonegran in addition to the Keppra.  Rx printed plain paper.    3) Using ativan as needed.

## 2018-08-29 NOTE — TELEPHONE ENCOUNTER
Patients mother phoned this morning. She states that patient's copay is $900 until they meet their $7,500 deductible. They are unable to afford this medication at this time. Their insurance will not allow for a tier reduction, or co-pay reduction due to this deductible. Patient needs a different medication option. Patient's mother states that she had another episode today and fell down. Mom would like something different to be sent to her pharmacy today. And for you to please call her directly.

## 2018-08-29 NOTE — TELEPHONE ENCOUNTER
S/w Mary Beth at this time.  Discussed options with medication and that the new Rx for Zonisamide is sent with titration instructions.    Will stay in telephone contact regarding any concerns.  They have chosen to not seek medical care for the incident reports from last night.

## 2018-08-30 ENCOUNTER — PATIENT MESSAGE (OUTPATIENT)
Dept: NEUROLOGY | Facility: MEDICAL CENTER | Age: 23
End: 2018-08-30

## 2018-08-30 NOTE — TELEPHONE ENCOUNTER
From: Laura Rosario  To: CORNELIUS Anderson  Sent: 8/30/2018 4:09 PM PDT  Subject: Prescription Question    Hi I was checking with my pharmacy about the new prescription you talk with my mom about yesterday but they have not received it. When should I expect it to be to the pharmacy? Thank you

## 2018-08-31 ENCOUNTER — PATIENT MESSAGE (OUTPATIENT)
Dept: NEUROLOGY | Facility: MEDICAL CENTER | Age: 23
End: 2018-08-31

## 2018-09-04 ENCOUNTER — HOSPITAL ENCOUNTER (OUTPATIENT)
Dept: LAB | Facility: MEDICAL CENTER | Age: 23
End: 2018-09-04
Attending: INTERNAL MEDICINE
Payer: COMMERCIAL

## 2018-09-04 PROCEDURE — 88175 CYTOPATH C/V AUTO FLUID REDO: CPT

## 2018-09-04 PROCEDURE — 87591 N.GONORRHOEAE DNA AMP PROB: CPT

## 2018-09-04 PROCEDURE — 87491 CHLMYD TRACH DNA AMP PROBE: CPT

## 2018-09-04 PROCEDURE — 87624 HPV HI-RISK TYP POOLED RSLT: CPT

## 2018-09-05 NOTE — TELEPHONE ENCOUNTER
From: Laura Rosario  To: Lori Guaman, Med Ass't  Sent: 8/31/2018 3:41 PM PDT  Subject: Prescription Question    Thank you they received it but the pharmacist just called and asked if you could call they need some clarification on the instructions please.I appreciate it.  ----- Message -----  From: Lori Guaman, Med Ass't  Sent: 8/30/2018 4:22 PM PDT  To: Laura Rosario  Subject: RE: Prescription Question  Blanco Sanchez,  I just re-faxed it to your [facility] there on [address]. Would you prefer that it goes to a different Pharmacy?  Thanks,  Lori      ----- Message -----   From: Laura Rosario   Sent: 8/30/2018 4:09 PM PDT   To: CORNELIUS Anderson  Subject: Prescription Question    Hi I was checking with my pharmacy about the new prescription you talk with my mom about yesterday but they have not received it. When should I expect it to be to the pharmacy? Thank you

## 2018-09-06 LAB
C TRACH DNA GENITAL QL NAA+PROBE: NEGATIVE
CYTOLOGY REG CYTOL: NORMAL
HPV HR 12 DNA CVX QL NAA+PROBE: NEGATIVE
HPV16 DNA SPEC QL NAA+PROBE: NEGATIVE
HPV18 DNA SPEC QL NAA+PROBE: NEGATIVE
N GONORRHOEA DNA GENITAL QL NAA+PROBE: NEGATIVE
SPECIMEN SOURCE: NORMAL
SPECIMEN SOURCE: NORMAL

## 2018-09-13 ENCOUNTER — HOSPITAL ENCOUNTER (OUTPATIENT)
Dept: RADIOLOGY | Facility: MEDICAL CENTER | Age: 23
End: 2018-09-13
Attending: NURSE PRACTITIONER
Payer: COMMERCIAL

## 2018-09-13 ENCOUNTER — HOSPITAL ENCOUNTER (OUTPATIENT)
Dept: LAB | Facility: MEDICAL CENTER | Age: 23
End: 2018-09-13
Attending: INTERNAL MEDICINE
Payer: COMMERCIAL

## 2018-09-13 DIAGNOSIS — G40.909 SEIZURE DISORDER (HCC): ICD-10-CM

## 2018-09-13 PROCEDURE — 700117 HCHG RX CONTRAST REV CODE 255: Performed by: NURSE PRACTITIONER

## 2018-09-13 PROCEDURE — 80177 DRUG SCRN QUAN LEVETIRACETAM: CPT

## 2018-09-13 PROCEDURE — 70553 MRI BRAIN STEM W/O & W/DYE: CPT

## 2018-09-13 PROCEDURE — 36415 COLL VENOUS BLD VENIPUNCTURE: CPT

## 2018-09-13 PROCEDURE — A9585 GADOBUTROL INJECTION: HCPCS | Performed by: NURSE PRACTITIONER

## 2018-09-13 RX ORDER — GADOBUTROL 604.72 MG/ML
10 INJECTION INTRAVENOUS ONCE
Status: COMPLETED | OUTPATIENT
Start: 2018-09-13 | End: 2018-09-13

## 2018-09-13 RX ADMIN — GADOBUTROL 10 ML: 604.72 INJECTION INTRAVENOUS at 07:48

## 2018-09-14 LAB — LEVETIRACETAM SERPL-MCNC: 51 UG/ML (ref 12–46)

## 2018-09-20 ENCOUNTER — OFFICE VISIT (OUTPATIENT)
Dept: NEUROLOGY | Facility: MEDICAL CENTER | Age: 23
End: 2018-09-20
Payer: COMMERCIAL

## 2018-09-20 VITALS
SYSTOLIC BLOOD PRESSURE: 106 MMHG | DIASTOLIC BLOOD PRESSURE: 60 MMHG | HEART RATE: 78 BPM | TEMPERATURE: 97.7 F | OXYGEN SATURATION: 98 % | RESPIRATION RATE: 18 BRPM | WEIGHT: 207.8 LBS | HEIGHT: 63 IN | BODY MASS INDEX: 36.82 KG/M2

## 2018-09-20 DIAGNOSIS — G40.909 SEIZURE DISORDER (HCC): ICD-10-CM

## 2018-09-20 PROCEDURE — 99214 OFFICE O/P EST MOD 30 MIN: CPT | Performed by: NURSE PRACTITIONER

## 2018-09-20 RX ORDER — LEVONORGESTREL/ETHINYL ESTRADIOL AND ETHINYL ESTRADIOL 100-20(84)
KIT ORAL
Refills: 3 | COMMUNITY
Start: 2018-09-04

## 2018-09-20 NOTE — PROGRESS NOTES
"Subjective:      Laura Rosario is a 22 y.o. female who presents with Follow-Up (Seizure disorder )            HPI    May 2018 through August 3rd she has had at least 4 episodes concerning for seizures.     8/3/2018 Nocturnal seizure, bit tongue, found face down in her pillow.    8/28/18 She was at home and watching TV.  Had eaten dinner, lounging on the couch.  Got up to walk upstairs, turned on the light and doesn't remember walking upstairs and when she entered the bedroom she must of pivoted and lost consciousness and fell onto her face.   She injured her nose and face bone, skinned her knees.  Feels like she lost awareness for about 15 minutes.  Due to start placebo birth control that Sunday.    Unable to begin Fycompa due to insurance difficulty.    Started on Zonegran titration.  She has felt like she has had a hang over with the Zonegran.  \"Some days are better than other\".  This is Day 2 or 3 of taking Zonegran 200mg BID.  She feels a bit \"overloaded\".  Has felt like her head is bonging up and down.    No concerns for seizures.    9/2018 Brain MRI Impression       MRI of the brain without and with contrast within normal limits.         Current Outpatient Prescriptions   Medication Sig Dispense Refill   • AMETHIA LO 0.1-0.02 & 0.01 MG Tab TK 1 T PO D  3   • zonisamide (ZONEGRAN) 100 MG Cap Take one capsule po qhs X 5 days then one capsule po BID X 5 days then one caps qam and 2 caps po qhs X 5 days then 2 capsules po BID X 5 days then 2 caps qam X 5 days then 3 capsules po qhs X 5 days then 3 capsules po BID thereafter. 180 Cap 5   • ibuprofen (MOTRIN) 800 MG Tab TK 1 T PO Q 8 H PRF ORTHOPEDIC PAIN OR HEADACHE  4   • Levetiracetam 750 MG TABLET SR 24 HR Take 2 tablets by mouth every morning and 3 tablets po qhs. 150 Tab 11   • vitamin D (CHOLECALCIFEROL) 1000 UNIT Tab Take 1,000 Units by mouth every day.     • Cyanocobalamin (B-12 PO) Take  by mouth.       No current facility-administered medications for " this visit.        Review of Systems   Constitutional: Negative.    HENT: Negative for hearing loss, nosebleeds and sore throat.         No recent head injury.   Eyes: Negative for double vision.        No new loss of vision.   Respiratory: Negative for cough.         No recent lung infections.   Cardiovascular: Negative for chest pain.   Gastrointestinal: Negative for abdominal pain, diarrhea, nausea and vomiting.   Genitourinary: Negative.    Musculoskeletal: Negative.    Skin: Negative.    Neurological: Negative for headaches.   Endo/Heme/Allergies:        No history of endocrine dysfunction.  No new problems.   Psychiatric/Behavioral: Positive for memory loss. Negative for depression. The patient is not nervous/anxious.         No recent mood changes.          Objective:     There were no vitals taken for this visit.     Physical Exam   Constitutional: She is oriented to person, place, and time. She appears well-developed and well-nourished.   HENT:   Head: Normocephalic and atraumatic.   Eyes: Pupils are equal, round, and reactive to light.   Neck: Normal range of motion.   Cardiovascular: Normal rate and regular rhythm.    Pulmonary/Chest: Effort normal.   Neurological: She is alert and oriented to person, place, and time. She exhibits normal muscle tone. Gait normal.   No observable changes in neurologic status.  See initial new patient examination for details.    Skin: Skin is warm.   Psychiatric: She has a normal mood and affect.               Assessment/Plan:     Seizure disorder, probable generalized epilepsy:  May 2018 through August 3rd she has had at least 4 episodes concerning for seizures.     8/3/2018 Nocturnal seizure, bit tongue, found face down in her pillow.    8/28/2018: longer amnestic period and recovery time with seizure occurring before bed and before taking night dose of AED.     Headaches are no longer a concern.     Continue Leviteracetam ER 1500mg-2250mg.     Continue titration of ZNS--  currently taking 200mg BID-- hopefully will tolerate increase to 200-300mg at least.     Recommend that she change to a birth control method in which she eliminates her period.     Discussed future pregnancy, life quality, safety, etc.  She does not drive.    Return for follow-up in 3 months.  I spent 60+ minutes with this patient, over fifty percent was spent counseling patient on their condition, best management practices, reviewing test results and risks and benefits of treatment.

## 2018-09-21 ASSESSMENT — ENCOUNTER SYMPTOMS
DEPRESSION: 0
VOMITING: 0
MUSCULOSKELETAL NEGATIVE: 1
COUGH: 0
NAUSEA: 0
HEADACHES: 0
CONSTITUTIONAL NEGATIVE: 1
MEMORY LOSS: 1
ABDOMINAL PAIN: 0
NERVOUS/ANXIOUS: 0
DIARRHEA: 0
DOUBLE VISION: 0
SORE THROAT: 0

## 2018-11-15 ENCOUNTER — OFFICE VISIT (OUTPATIENT)
Dept: NEUROLOGY | Facility: MEDICAL CENTER | Age: 23
End: 2018-11-15
Payer: COMMERCIAL

## 2018-11-15 VITALS
WEIGHT: 203.71 LBS | HEART RATE: 59 BPM | SYSTOLIC BLOOD PRESSURE: 114 MMHG | HEIGHT: 63 IN | RESPIRATION RATE: 18 BRPM | OXYGEN SATURATION: 97 % | DIASTOLIC BLOOD PRESSURE: 66 MMHG | TEMPERATURE: 97.8 F | BODY MASS INDEX: 36.09 KG/M2

## 2018-11-15 DIAGNOSIS — G43.009 MIGRAINE WITHOUT AURA AND WITHOUT STATUS MIGRAINOSUS, NOT INTRACTABLE: ICD-10-CM

## 2018-11-15 DIAGNOSIS — G40.909 SEIZURE DISORDER (HCC): ICD-10-CM

## 2018-11-15 DIAGNOSIS — R41.3 MEMORY DEFICIT: ICD-10-CM

## 2018-11-15 PROCEDURE — 99214 OFFICE O/P EST MOD 30 MIN: CPT | Performed by: NURSE PRACTITIONER

## 2018-11-15 RX ORDER — ZOLMITRIPTAN 5 MG/1
TABLET, ORALLY DISINTEGRATING ORAL
Qty: 9 TAB | Refills: 5 | Status: SHIPPED | OUTPATIENT
Start: 2018-11-15 | End: 2020-05-06 | Stop reason: SDUPTHER

## 2018-11-15 ASSESSMENT — ENCOUNTER SYMPTOMS
SEIZURES: 1
MUSCULOSKELETAL NEGATIVE: 1
HEADACHES: 0
VOMITING: 0
MEMORY LOSS: 1
DEPRESSION: 0
COUGH: 0
NAUSEA: 0
SORE THROAT: 0
NERVOUS/ANXIOUS: 0
DOUBLE VISION: 0
CONSTITUTIONAL NEGATIVE: 1
ABDOMINAL PAIN: 0
DIARRHEA: 0

## 2018-11-15 ASSESSMENT — PAIN SCALES - GENERAL: PAINLEVEL: NO PAIN

## 2018-11-15 NOTE — PATIENT INSTRUCTIONS
DMV form:    1) Today, call Valeria through the DMV to tell her that her driving is on hold until or after 11/28/2018,  Wednesday, as long as Laura is event free until that time.    2) Will be cleared on 11/28/2018-- needs to come to the office that day so I can say she is alive and well.  No appt will be scheduled.  I will complete the DMV form and fax.    3) Yearly forms will need to be completed around the end of November unless seizures occur and that will reset the DMV/license.          Zonegran-- recommend increasing dose to 2 capsules in the morning and 4 capsules each night.

## 2018-11-15 NOTE — PROGRESS NOTES
Subjective:      Laura Rosario is a 23 y.o. female who presents with Follow-Up (Seizure disorder / DMV paperwork / Migraines)        Here with grandmother today, minimal insight for Laura.    HPI    Migraines have returned for her.    8/28/2018 episode with loss of consciousness and fall occurred.    She did not have her menses recently.  With taking ibuprofen her migraine did not stop.  The headache persisted for 3 days.  Unrelieved with sleep which typically helps.    Due for her cycle next week.    Has been successful to increase the ZNS to 200mg-300mg.     Does have a DMV form that needs to be renewed.  Seems to have a disconnect with what needs to be done with the DMV as far as restriction from driving for a total of 3 months.    INTERPRETATION:  This is an abnormal routine EEG recording in the awake, drowsy, and sleep state(s).  Rare bursts of generalized, poorly formed, 3-4 HZ spike and slow wave complexes. Rare independent left and right temporal sharps with intermittent slowing. The findings may suggest: 1)-A primary generalized epilepsy, with fragmented discharges, 2)-Mixed generalized and focal epilepsy (rare), or 3)-Multifocal epilepsy with fast generalization. Consider obtaining a prolonged study if clinically indicated. No typical events were captured during the study. Clinical and radiological correlation is recommended.    Current Outpatient Prescriptions   Medication Sig Dispense Refill   • AMETHIA LO 0.1-0.02 & 0.01 MG Tab TK 1 T PO D  3   • zonisamide (ZONEGRAN) 100 MG Cap Take one capsule po qhs X 5 days then one capsule po BID X 5 days then one caps qam and 2 caps po qhs X 5 days then 2 capsules po BID X 5 days then 2 caps qam X 5 days then 3 capsules po qhs X 5 days then 3 capsules po BID thereafter. 180 Cap 5   • ibuprofen (MOTRIN) 800 MG Tab TK 1 T PO Q 8 H PRF ORTHOPEDIC PAIN OR HEADACHE  4   • Levetiracetam 750 MG TABLET SR 24 HR Take 2 tablets by mouth every morning and 3 tablets po  "qhs. 150 Tab 11   • vitamin D (CHOLECALCIFEROL) 1000 UNIT Tab Take 1,000 Units by mouth every day.     • Cyanocobalamin (B-12 PO) Take  by mouth.       No current facility-administered medications for this visit.        Review of Systems   Constitutional: Negative.    HENT: Negative for hearing loss, nosebleeds and sore throat.         No recent head injury.   Eyes: Negative for double vision.        No new loss of vision.   Respiratory: Negative for cough.         No recent lung infections.   Cardiovascular: Negative for chest pain.   Gastrointestinal: Negative for abdominal pain, diarrhea, nausea and vomiting.   Genitourinary: Negative.    Musculoskeletal: Negative.    Skin: Negative.    Neurological: Positive for seizures. Negative for headaches.   Endo/Heme/Allergies:        No history of endocrine dysfunction.  No new problems.   Psychiatric/Behavioral: Positive for memory loss. Negative for depression. The patient is not nervous/anxious.         No recent mood changes.          Objective:     /66   Pulse (!) 59   Temp 36.6 °C (97.8 °F)   Resp 18   Ht 1.588 m (5' 2.5\")   Wt 92.4 kg (203 lb 11.3 oz)   SpO2 97%   BMI 36.66 kg/m²      Physical Exam   Constitutional: She is oriented to person, place, and time. She appears well-developed and well-nourished.   HENT:   Head: Normocephalic and atraumatic.   Eyes: EOM are normal.   Neck: Normal range of motion.   Cardiovascular: Normal rate and regular rhythm.    Pulmonary/Chest: Effort normal.   Neurological: She is alert and oriented to person, place, and time. She exhibits normal muscle tone. Gait normal.   No observable changes in neurologic status.  See initial new patient examination for details.    Skin: Skin is warm.   Psychiatric: She has a normal mood and affect.             Assessment/Plan:     Seizure disorder, probable generalized epilepsy:  May 2018 through August 3rd she has had at least 4 episodes concerning for seizures.     8/3/2018 " Nocturnal seizure, bit tongue, found face down in her pillow.     8/28/2018: longer amnestic period and recovery time with seizure occurring before bed and before taking night dose of AED.    Headaches are more of a recent concern.    Continue Leviteracetam ER 1500mg-2250mg.  Has done well with the ZNS increase to 200-300mg-- instructed to increase to 200-400mg within the next few days.    Continue using birth control, taking MVI and Vit D/Calcium supplements.    DMV form to be completed on or after 11/28/2018.  It will be limited to daylight only, surface streets when possible.    Due to her memory deficit I feel that she is in a high risk category.    Return for follow-up on or after 11/28/2018 for DMV form and in 3 months for epilepsy management.      EDUCATION AND COUNSELING:  -Education was provided to the patient and/or family regarding diagnosis and prognosis. The chronic and unpredictable nature of the condition was discussed. There is increased risk for additional events, which may carry potential for significant injuries and death.    -We reviewed the current antiepileptic regimen. Potential side effects of antiepileptics were discussed at length, including but no limited to: hypersensitivity reactions (rash and others, some of which can be fatal), visual field changes (some of which may be irreversible), glaucoma, diplopia, kidney stones, osteopenia/osteoporosis/bone fractures, hyperthermia/anhydrosis, tremors, ataxia, dizziness, fatigue, increased risk for falls, cardiac arrhythmias/syncope, gastrointestinal (hepatitis, pancreatitis, gastritis, ulcers), gingival hypertrophy, drowsiness, sedation, anxiety/nervousness, increased risk for suicide, increased risk for depression, and psychosis. We reviewed drug-drug interactions and their potential effect on seizure control and medication side effects.    -Patient/family educated on SUDEP (Sudden Death in Epilepsy). Counseling was provided on the importance  of strict medication and follow up compliance. The patient understands the risks associated with non-adherence with the medical plan as outlined, including but not limited to an increased risk for breakthrough seizures, which may contribute to injuries, disability, status epilepticus, and even death.    -Counseling was also provided on potential effects of alcohol and other drugs, which may lower seizure threshold and/or affect the metabolism of antiepileptic drugs. I recommend avoidance of alcohol and illegal drugs.  -Recommend proper hydration, regular exercise, proper sleep hygiene (7-8 hrs of overnight sleep, avoid sleep deprivation).    -I have made the patient aware of mandatory reporting required by the law in the State [facility] regarding episodes of seizures, loss of consciousness, and/or alteration of awareness. The patient and family are responsible for reporting events to the DMV, instructions were provided. The patient verbalized understanding and states she has not been driving. Other seizure precautions were discussed at length, including no diving, no skydiving, no unsupervised swimming, no Jacuzzi or bathing in bathtubs.    -She is ok to return to work but cannot operate any heavy machinery, he verbalized understanding.     Pt agrees with plan.

## 2018-11-19 PROBLEM — R41.3 MEMORY DEFICIT: Status: ACTIVE | Noted: 2018-11-19

## 2018-11-19 PROBLEM — G40.909 SEIZURE DISORDER (HCC): Status: ACTIVE | Noted: 2018-11-19

## 2019-03-15 ENCOUNTER — TELEPHONE (OUTPATIENT)
Dept: NEUROLOGY | Facility: MEDICAL CENTER | Age: 24
End: 2019-03-15

## 2019-03-15 NOTE — TELEPHONE ENCOUNTER
Received VM from [facility] pharmacy to verify zonisamide SIG, on medications it says SIG: SEE ATTACHMENT. Please advise and will call pharmacy.

## 2019-03-18 NOTE — TELEPHONE ENCOUNTER
Sounds like there needs to be a SIG clarification on her Zonegran prescription.    Per my last note: instructed to increase to 200-400mg within the next few days.

## 2019-03-20 ENCOUNTER — HOSPITAL ENCOUNTER (OUTPATIENT)
Dept: LAB | Facility: MEDICAL CENTER | Age: 24
End: 2019-03-20
Attending: INTERNAL MEDICINE
Payer: COMMERCIAL

## 2019-03-20 LAB
ALBUMIN SERPL BCP-MCNC: 4.6 G/DL (ref 3.2–4.9)
ALBUMIN/GLOB SERPL: 1.6 G/DL
ALP SERPL-CCNC: 62 U/L (ref 30–99)
ALT SERPL-CCNC: 28 U/L (ref 2–50)
ANION GAP SERPL CALC-SCNC: 19 MMOL/L (ref 0–11.9)
AST SERPL-CCNC: 20 U/L (ref 12–45)
BILIRUB SERPL-MCNC: 0.3 MG/DL (ref 0.1–1.5)
BUN SERPL-MCNC: 17 MG/DL (ref 8–22)
CALCIUM SERPL-MCNC: 9.4 MG/DL (ref 8.5–10.5)
CHLORIDE SERPL-SCNC: 107 MMOL/L (ref 96–112)
CO2 SERPL-SCNC: 12 MMOL/L (ref 20–33)
CREAT SERPL-MCNC: 0.78 MG/DL (ref 0.5–1.4)
GLOBULIN SER CALC-MCNC: 2.9 G/DL (ref 1.9–3.5)
GLUCOSE SERPL-MCNC: 73 MG/DL (ref 65–99)
POTASSIUM SERPL-SCNC: 4 MMOL/L (ref 3.6–5.5)
PROT SERPL-MCNC: 7.5 G/DL (ref 6–8.2)
SODIUM SERPL-SCNC: 138 MMOL/L (ref 135–145)
T4 FREE SERPL-MCNC: 0.88 NG/DL (ref 0.53–1.43)
TSH SERPL DL<=0.005 MIU/L-ACNC: 3.05 UIU/ML (ref 0.38–5.33)

## 2019-03-20 PROCEDURE — 36415 COLL VENOUS BLD VENIPUNCTURE: CPT

## 2019-03-20 PROCEDURE — 80053 COMPREHEN METABOLIC PANEL: CPT

## 2019-03-20 PROCEDURE — 84439 ASSAY OF FREE THYROXINE: CPT

## 2019-03-20 PROCEDURE — 84443 ASSAY THYROID STIM HORMONE: CPT

## 2019-03-25 ENCOUNTER — OFFICE VISIT (OUTPATIENT)
Dept: NEUROLOGY | Facility: MEDICAL CENTER | Age: 24
End: 2019-03-25
Payer: COMMERCIAL

## 2019-03-25 VITALS
WEIGHT: 201 LBS | HEART RATE: 66 BPM | TEMPERATURE: 98.2 F | SYSTOLIC BLOOD PRESSURE: 110 MMHG | HEIGHT: 63 IN | OXYGEN SATURATION: 98 % | RESPIRATION RATE: 16 BRPM | DIASTOLIC BLOOD PRESSURE: 60 MMHG | BODY MASS INDEX: 35.61 KG/M2

## 2019-03-25 DIAGNOSIS — G40.909 SEIZURE DISORDER (HCC): ICD-10-CM

## 2019-03-25 DIAGNOSIS — G43.009 MIGRAINE WITHOUT AURA AND WITHOUT STATUS MIGRAINOSUS, NOT INTRACTABLE: ICD-10-CM

## 2019-03-25 DIAGNOSIS — R41.3 MEMORY DEFICIT: ICD-10-CM

## 2019-03-25 PROCEDURE — 99213 OFFICE O/P EST LOW 20 MIN: CPT | Performed by: NURSE PRACTITIONER

## 2019-03-25 RX ORDER — LEVETIRACETAM 750 MG/1
TABLET, FILM COATED, EXTENDED RELEASE ORAL
Qty: 150 TAB | Refills: 5 | Status: SHIPPED | OUTPATIENT
Start: 2019-03-25 | End: 2019-09-30 | Stop reason: SDUPTHER

## 2019-03-25 RX ORDER — ZONISAMIDE 100 MG/1
CAPSULE ORAL
Qty: 180 CAP | Refills: 5 | Status: SHIPPED | OUTPATIENT
Start: 2019-03-25 | End: 2019-11-05 | Stop reason: SDUPTHER

## 2019-03-25 ASSESSMENT — ENCOUNTER SYMPTOMS
DIARRHEA: 0
CONSTITUTIONAL NEGATIVE: 1
ABDOMINAL PAIN: 0
VOMITING: 0
MUSCULOSKELETAL NEGATIVE: 1
DEPRESSION: 0
SORE THROAT: 0
NAUSEA: 0
NERVOUS/ANXIOUS: 0
COUGH: 0
HEADACHES: 0
DOUBLE VISION: 0

## 2019-03-25 ASSESSMENT — PATIENT HEALTH QUESTIONNAIRE - PHQ9: CLINICAL INTERPRETATION OF PHQ2 SCORE: 0

## 2019-03-25 NOTE — PROGRESS NOTES
Subjective:      Laura Rosario is a 23 y.o. female who presents with Follow-Up (Migraine without aura and without status migrainosus, not intractable)          HPI  Has titrated the -400mg since last appointment.    Seizures: last know was 8/282/2018    Migraines:  No recent migraine.  Will have a tension headache from time to time.    Memory:  Clarity has improved.  She does not seem as irritable and able to converse clearly.    INTERPRETATION:  This is an abnormal routine EEG recording in the awake, drowsy, and sleep state(s).  Rare bursts of generalized, poorly formed, 3-4 HZ spike and slow wave complexes. Rare independent left and right temporal sharps with intermittent slowing. The findings may suggest: 1)-A primary generalized epilepsy, with fragmented discharges, 2)-Mixed generalized and focal epilepsy (rare), or 3)-Multifocal epilepsy with fast generalization. Consider obtaining a prolonged study if clinically indicated. No typical events were captured during the study. Clinical and radiological correlation is recommended.    Plans to  on August 10, 2019.  Plans to travel to [place] the end of September.        Current Outpatient Prescriptions   Medication Sig Dispense Refill   • zonisamide (ZONEGRAN) 100 MG Cap SEE ATTACHMENT 180 Cap 1   • Levetiracetam 750 MG TABLET SR 24 HR TAKE 2 TABLETS BY MOUTH EVERY MORNING, AND 3 TABLETS AT BEDTIME 150 Tab 1   • zolmitriptan (ZOMIG-ZMT) 5 MG disintegrating tablet Take 1/2-1 tablet po at onset of headache, may repeat dose in 2 hours if unrelieved.  Do not exceed more than 2 tablets in 24 hours. 9 Tab 5   • AMETHIA LO 0.1-0.02 & 0.01 MG Tab TK 1 T PO D  3   • ibuprofen (MOTRIN) 800 MG Tab TK 1 T PO Q 8 H PRF ORTHOPEDIC PAIN OR HEADACHE  4   • Levetiracetam 750 MG TABLET SR 24 HR Take 2 tablets by mouth every morning and 3 tablets po qhs. 150 Tab 11   • vitamin D (CHOLECALCIFEROL) 1000 UNIT Tab Take 1,000 Units by mouth every day.     • Cyanocobalamin  "(B-12 PO) Take  by mouth.       No current facility-administered medications for this visit.          Review of Systems   Constitutional: Negative.    HENT: Negative for hearing loss, nosebleeds and sore throat.         No recent head injury.   Eyes: Negative for double vision.        No new loss of vision.   Respiratory: Negative for cough.         No recent lung infections.   Cardiovascular: Negative for chest pain.   Gastrointestinal: Negative for abdominal pain, diarrhea, nausea and vomiting.   Genitourinary: Negative.    Musculoskeletal: Negative.    Skin: Negative.    Neurological: Positive for seizures. Negative for headaches.   Endo/Heme/Allergies:        No history of endocrine dysfunction.  No new problems.   Psychiatric/Behavioral: Positive for memory loss. Negative for depression. The patient is not nervous/anxious.         No recent mood changes.          Objective:     /60 (BP Location: Right arm, Patient Position: Sitting, BP Cuff Size: Adult)   Pulse 66   Temp 36.8 °C (98.2 °F) (Temporal)   Resp 16   Ht 1.588 m (5' 2.52\")   Wt 91.2 kg (201 lb)   SpO2 98%   BMI 36.15 kg/m²      Physical Exam   Constitutional: She is oriented to person, place, and time. She appears well-developed and well-nourished. No distress.   HENT:   Head: Normocephalic and atraumatic.   Eyes: Pupils are equal, round, and reactive to light.   Neck: Normal range of motion.   Cardiovascular: Normal rate and regular rhythm.    Pulmonary/Chest: Effort normal and breath sounds normal. No respiratory distress.   Musculoskeletal: Normal range of motion.   Neurological: She is alert and oriented to person, place, and time. She has normal strength and normal reflexes. No cranial nerve deficit. She exhibits normal muscle tone. Coordination normal.   No observable changes in neurologic status.  See initial new patient examination for details.     Skin: Skin is warm and dry.   Psychiatric: She has a normal mood and affect.        "        Assessment/Plan:     Seizure disorder, probable generalized epilepsy:  May 2018 through August 3rd she has had at least 4 episodes concerning for seizures.     8/3/2018 Nocturnal seizure, bit tongue, found face down in her pillow.     8/28/2018: longer amnestic period and recovery time with seizure occurring before bed and before taking night dose of AED.     Migraine headaches:  Not a concern at this time.     Continue Leviteracetam ER 1500mg-2250mg and -400mg.     Continue using birth control, taking MVI and Vit D/Calcium supplements.     Due to her memory deficit I feel that she is in a high risk category.     Return for follow-up in August 2019-- will need to discuss travel plans.        EDUCATION AND COUNSELING:  -Education was provided to the patient and/or family regarding diagnosis and prognosis. The chronic and unpredictable nature of the condition was discussed. There is increased risk for additional events, which may carry potential for significant injuries and death.    -We reviewed the current antiepileptic regimen. Potential side effects of antiepileptics were discussed at length, including but no limited to: hypersensitivity reactions (rash and others, some of which can be fatal), visual field changes (some of which may be irreversible), glaucoma, diplopia, kidney stones, osteopenia/osteoporosis/bone fractures, hyperthermia/anhydrosis, tremors, ataxia, dizziness, fatigue, increased risk for falls, cardiac arrhythmias/syncope, gastrointestinal (hepatitis, pancreatitis, gastritis, ulcers), gingival hypertrophy, drowsiness, sedation, anxiety/nervousness, increased risk for suicide, increased risk for depression, and psychosis. We reviewed drug-drug interactions and their potential effect on seizure control and medication side effects.    -Patient/family educated on SUDEP (Sudden Death in Epilepsy). Counseling was provided on the importance of strict medication and follow up compliance. The  patient understands the risks associated with non-adherence with the medical plan as outlined, including but not limited to an increased risk for breakthrough seizures, which may contribute to injuries, disability, status epilepticus, and even death.    -Counseling was also provided on potential effects of alcohol and other drugs, which may lower seizure threshold and/or affect the metabolism of antiepileptic drugs. I recommend avoidance of alcohol and illegal drugs.  -Recommend proper hydration, regular exercise, proper sleep hygiene (7-8 hrs of overnight sleep, avoid sleep deprivation).    -I have made the patient aware of mandatory reporting required by the law in the State [facility] regarding episodes of seizures, loss of consciousness, and/or alteration of awareness. The patient and family are responsible for reporting events to the DMV, instructions were provided. The patient verbalized understanding and states she has not been driving. Other seizure precautions were discussed at length, including no diving, no skydiving, no unsupervised swimming, no Jacuzzi or bathing in bathtubs.    -She is ok to return to work but cannot operate any heavy machinery, he verbalized understanding.      Pt agrees with plan.

## 2019-03-26 ASSESSMENT — ENCOUNTER SYMPTOMS
MEMORY LOSS: 1
SEIZURES: 1

## 2019-08-06 ENCOUNTER — HOSPITAL ENCOUNTER (OUTPATIENT)
Dept: LAB | Facility: MEDICAL CENTER | Age: 24
End: 2019-08-06
Attending: INTERNAL MEDICINE
Payer: COMMERCIAL

## 2019-08-06 LAB
BASOPHILS # BLD AUTO: 0.3 % (ref 0–1.8)
BASOPHILS # BLD: 0.02 K/UL (ref 0–0.12)
EOSINOPHIL # BLD AUTO: 0.03 K/UL (ref 0–0.51)
EOSINOPHIL NFR BLD: 0.4 % (ref 0–6.9)
ERYTHROCYTE [DISTWIDTH] IN BLOOD BY AUTOMATED COUNT: 41 FL (ref 35.9–50)
HCT VFR BLD AUTO: 43.2 % (ref 37–47)
HGB BLD-MCNC: 13.9 G/DL (ref 12–16)
IMM GRANULOCYTES # BLD AUTO: 0.01 K/UL (ref 0–0.11)
IMM GRANULOCYTES NFR BLD AUTO: 0.1 % (ref 0–0.9)
LYMPHOCYTES # BLD AUTO: 3.1 K/UL (ref 1–4.8)
LYMPHOCYTES NFR BLD: 40.9 % (ref 22–41)
MCH RBC QN AUTO: 30.9 PG (ref 27–33)
MCHC RBC AUTO-ENTMCNC: 32.2 G/DL (ref 33.6–35)
MCV RBC AUTO: 96 FL (ref 81.4–97.8)
MONOCYTES # BLD AUTO: 0.48 K/UL (ref 0–0.85)
MONOCYTES NFR BLD AUTO: 6.3 % (ref 0–13.4)
NEUTROPHILS # BLD AUTO: 3.94 K/UL (ref 2–7.15)
NEUTROPHILS NFR BLD: 52 % (ref 44–72)
NRBC # BLD AUTO: 0 K/UL
NRBC BLD-RTO: 0 /100 WBC
PLATELET # BLD AUTO: 153 K/UL (ref 164–446)
PMV BLD AUTO: 12.3 FL (ref 9–12.9)
RBC # BLD AUTO: 4.5 M/UL (ref 4.2–5.4)
WBC # BLD AUTO: 7.6 K/UL (ref 4.8–10.8)

## 2019-08-06 PROCEDURE — 83036 HEMOGLOBIN GLYCOSYLATED A1C: CPT

## 2019-08-06 PROCEDURE — 36415 COLL VENOUS BLD VENIPUNCTURE: CPT

## 2019-08-06 PROCEDURE — 85025 COMPLETE CBC W/AUTO DIFF WBC: CPT

## 2019-08-07 LAB
EST. AVERAGE GLUCOSE BLD GHB EST-MCNC: 108 MG/DL
HBA1C MFR BLD: 5.4 % (ref 0–5.6)

## 2019-09-16 ENCOUNTER — OFFICE VISIT (OUTPATIENT)
Dept: NEUROLOGY | Facility: MEDICAL CENTER | Age: 24
End: 2019-09-16
Payer: COMMERCIAL

## 2019-09-16 VITALS
TEMPERATURE: 98.6 F | HEART RATE: 75 BPM | DIASTOLIC BLOOD PRESSURE: 60 MMHG | HEIGHT: 63 IN | WEIGHT: 197 LBS | RESPIRATION RATE: 16 BRPM | SYSTOLIC BLOOD PRESSURE: 108 MMHG | OXYGEN SATURATION: 96 % | BODY MASS INDEX: 34.91 KG/M2

## 2019-09-16 DIAGNOSIS — R41.3 MEMORY DEFICIT: ICD-10-CM

## 2019-09-16 DIAGNOSIS — G40.909 SEIZURE DISORDER (HCC): ICD-10-CM

## 2019-09-16 DIAGNOSIS — G43.009 MIGRAINE WITHOUT AURA AND WITHOUT STATUS MIGRAINOSUS, NOT INTRACTABLE: ICD-10-CM

## 2019-09-16 PROCEDURE — 99214 OFFICE O/P EST MOD 30 MIN: CPT | Performed by: NURSE PRACTITIONER

## 2019-09-16 NOTE — LETTER
September 16, 2019        Laura Rosario  [address] [place] [address]        To whom this may concern:    Laura Rosario is followed by neurology for diagnosis of Seizure Disorder.  Please allow her to carry her anti-seizure medication on her person at all times.    The medications needed include:  Ativan (lorazepam)  Zonegran (zonisamide)  Keppra (leviteracetam)    If you have any questions or concerns, please don't hesitate to call.        Sincerely,        RILEY Anderson.    Electronically Signed

## 2019-09-16 NOTE — PROGRESS NOTES
Subjective:      Laura Rosario is a 23 y.o. female who presents with Follow-Up (Seizure disorder )            HPI      Seizures: last know was 8/282/2018.  Doing well with the Zonegran dose of 200-400mg since last appointment.    Did have a near syncopal episode during the wedding day.  Simply felt that her shoes were too tight and couldn't breathe.     Migraines:  No recent migraine.  Will have a tension headache from time to time.     Memory:  Clarity has improved.  She does not seem as irritable and able to converse clearly.    She is now  as of August 10, 2019.    Right hand, treated for a bed bug bite for which she needed antibiotics.    Energy level: having feelings of being very exhausted.  Often will get home before  and feel like she has no energy.  Will let the dogs out and rest on the couch and often fall asleep.  Then, the short nap interrupts her nighttime routine and ability to restfully enter sleep again.    INTERPRETATION:  This is an abnormal routine EEG recording in the awake, drowsy, and sleep state(s).  Rare bursts of generalized, poorly formed, 3-4 HZ spike and slow wave complexes. Rare independent left and right temporal sharps with intermittent slowing. The findings may suggest: 1)-A primary generalized epilepsy, with fragmented discharges, 2)-Mixed generalized and focal epilepsy (rare), or 3)-Multifocal epilepsy with fast generalization. Consider obtaining a prolonged study if clinically indicated. No typical events were captured during the study. Clinical and radiological correlation is recommended.    Current Outpatient Medications   Medication Sig Dispense Refill   • Levetiracetam 750 MG TABLET SR 24 HR TAKE 2 TABLETS BY MOUTH EVERY MORNING, AND 3 TABLETS AT BEDTIME 150 Tab 5   • zonisamide (ZONEGRAN) 100 MG Cap Take 2 capsules po qam and 4 capsules po qhs. 180 Cap 5   • zolmitriptan (ZOMIG-ZMT) 5 MG disintegrating tablet Take 1/2-1 tablet po at onset of headache, may  "repeat dose in 2 hours if unrelieved.  Do not exceed more than 2 tablets in 24 hours. 9 Tab 5   • AMETHIA LO 0.1-0.02 & 0.01 MG Tab TK 1 T PO D  3   • ibuprofen (MOTRIN) 800 MG Tab TK 1 T PO Q 8 H PRF ORTHOPEDIC PAIN OR HEADACHE  4   • vitamin D (CHOLECALCIFEROL) 1000 UNIT Tab Take 1,000 Units by mouth every day.     • Cyanocobalamin (B-12 PO) Take  by mouth.     • Levetiracetam 750 MG TABLET SR 24 HR Take 2 tablets by mouth every morning and 3 tablets po qhs. 150 Tab 11     No current facility-administered medications for this visit.          Review of Systems   Constitutional: Negative.    HENT: Negative for hearing loss, nosebleeds and sore throat.         No recent head injury.   Eyes: Negative for double vision.        No new loss of vision.   Respiratory: Negative for cough.         No recent lung infections.   Cardiovascular: Negative for chest pain.   Gastrointestinal: Negative for abdominal pain, diarrhea, nausea and vomiting.   Genitourinary: Negative.    Musculoskeletal: Negative.    Skin: Negative.    Neurological: Positive for dizziness. Negative for headaches.   Endo/Heme/Allergies:        No history of endocrine dysfunction.  No new problems.   Psychiatric/Behavioral: Negative for depression. The patient is not nervous/anxious.         No recent mood changes.          Objective:     /60 (BP Location: Right arm, Patient Position: Sitting, BP Cuff Size: Adult)   Pulse 75   Temp 37 °C (98.6 °F) (Temporal)   Resp 16   Ht 1.588 m (5' 2.52\")   Wt 89.4 kg (197 lb)   SpO2 96%   BMI 35.43 kg/m²      Physical Exam   Constitutional: She is oriented to person, place, and time. She appears well-developed and well-nourished. No distress.   HENT:   Head: Normocephalic and atraumatic.   Eyes: EOM are normal.   Neck: Normal range of motion.   Cardiovascular: Normal rate and regular rhythm.   Pulmonary/Chest: Effort normal.   Neurological: She is alert and oriented to person, place, and time. She exhibits " normal muscle tone. Gait normal.   No observable changes in neurologic status.  See initial new patient examination for details.    Skin: Skin is warm.   Psychiatric: She has a normal mood and affect.           Assessment/Plan:     Seizure disorder, probable generalized epilepsy:  May 2018 through August 3rd she has had at least 4 episodes concerning for seizures.     8/3/2018 Nocturnal seizure, bit tongue, found face down in her pillow.     8/28/2018: longer amnestic period and recovery time with seizure occurring before bed and before taking night dose of AED.     August 10, 2019 near-syncope episode on wedding day.    No concerns for seizures now for one year.    Migraine headaches:  Not a concern at this time.     Continue Leviteracetam ER 1500mg-2250mg and -400mg.     Continue using birth control, taking MVI and Vit D/Calcium supplements.      Letter provided for travel to [place] so she can keep ativan and anti-seizure medication on her.    Counseled regarding general health, diet, exercise, and rest.    Return for follow-up in 4 months.      EDUCATION AND COUNSELING:  -Education was provided to the patient and/or family regarding diagnosis and prognosis. The chronic and unpredictable nature of the condition was discussed. There is increased risk for additional events, which may carry potential for significant injuries and death.    -We reviewed the current antiepileptic regimen. Potential side effects of antiepileptics were discussed at length, including but no limited to: hypersensitivity reactions (rash and others, some of which can be fatal), visual field changes (some of which may be irreversible), glaucoma, diplopia, kidney stones, osteopenia/osteoporosis/bone fractures, hyperthermia/anhydrosis, tremors, ataxia, dizziness, fatigue, increased risk for falls, cardiac arrhythmias/syncope, gastrointestinal (hepatitis, pancreatitis, gastritis, ulcers), gingival hypertrophy, drowsiness, sedation,  anxiety/nervousness, increased risk for suicide, increased risk for depression, and psychosis. We reviewed drug-drug interactions and their potential effect on seizure control and medication side effects.    -Patient/family educated on SUDEP (Sudden Death in Epilepsy). Counseling was provided on the importance of strict medication and follow up compliance. The patient understands the risks associated with non-adherence with the medical plan as outlined, including but not limited to an increased risk for breakthrough seizures, which may contribute to injuries, disability, status epilepticus, and even death.    -Counseling was also provided on potential effects of alcohol and other drugs, which may lower seizure threshold and/or affect the metabolism of antiepileptic drugs. I recommend avoidance of alcohol and illegal drugs.  -Recommend proper hydration, regular exercise, proper sleep hygiene (7-8 hrs of overnight sleep, avoid sleep deprivation).    -I have made the patient aware of mandatory reporting required by the law in the State [facility] regarding episodes of seizures, loss of consciousness, and/or alteration of awareness. The patient and family are responsible for reporting events to the DMV, instructions were provided. The patient verbalized understanding and states she has not been driving. Other seizure precautions were discussed at length, including no diving, no skydiving, no unsupervised swimming, no Jacuzzi or bathing in bathtubs.    -She is ok to return to work but cannot operate any heavy machinery, he verbalized understanding.      Pt agrees with plan.

## 2019-09-17 ASSESSMENT — ENCOUNTER SYMPTOMS
MUSCULOSKELETAL NEGATIVE: 1
DIARRHEA: 0
NAUSEA: 0
VOMITING: 0
DOUBLE VISION: 0
DEPRESSION: 0
NERVOUS/ANXIOUS: 0
CONSTITUTIONAL NEGATIVE: 1
COUGH: 0
DIZZINESS: 1
HEADACHES: 0
ABDOMINAL PAIN: 0
SORE THROAT: 0

## 2019-11-12 ENCOUNTER — TELEPHONE (OUTPATIENT)
Dept: NEUROLOGY | Facility: MEDICAL CENTER | Age: 24
End: 2019-11-12

## 2019-11-12 DIAGNOSIS — G40.909 SEIZURE DISORDER (HCC): ICD-10-CM

## 2019-11-12 DIAGNOSIS — Z91.89 AT RISK FOR OSTEOPOROSIS: ICD-10-CM

## 2019-11-12 RX ORDER — LEVETIRACETAM 750 MG/1
TABLET ORAL
Qty: 150 TAB | Refills: 2 | Status: SHIPPED | OUTPATIENT
Start: 2019-11-12 | End: 2020-02-10

## 2019-11-12 NOTE — TELEPHONE ENCOUNTER
Patient called and stated that she is unable to get the Keppra XR. Is IR okay for patient to switch to? She has 5 days of medication left.     Please advise.

## 2019-11-13 NOTE — TELEPHONE ENCOUNTER
Called patient and advised. Told her I would sent it through [employer] as well for her to reference to.

## 2019-11-13 NOTE — PROGRESS NOTES
New Rx for Keppra sent to pharmacy on file with #2 refills.  Please ask at time of each monthly fill if Keppra XR is available and transition back onto XR when able to.      Call with any concerns.    Also, I'd appreciate a Keppra level once taking regular Keppra for about one week and ideally about 5 days prior (or before) upcoming appointment.  Epic labs ordered, trough please.

## 2019-11-21 ENCOUNTER — HOSPITAL ENCOUNTER (OUTPATIENT)
Dept: LAB | Facility: MEDICAL CENTER | Age: 24
End: 2019-11-21
Attending: NURSE PRACTITIONER
Payer: COMMERCIAL

## 2019-11-21 DIAGNOSIS — Z91.89 AT RISK FOR OSTEOPOROSIS: ICD-10-CM

## 2019-11-21 DIAGNOSIS — G40.909 SEIZURE DISORDER (HCC): ICD-10-CM

## 2019-11-21 LAB — 25(OH)D3 SERPL-MCNC: 19 NG/ML (ref 30–100)

## 2019-11-21 PROCEDURE — 80177 DRUG SCRN QUAN LEVETIRACETAM: CPT

## 2019-11-21 PROCEDURE — 82306 VITAMIN D 25 HYDROXY: CPT

## 2019-11-21 PROCEDURE — 36415 COLL VENOUS BLD VENIPUNCTURE: CPT

## 2019-11-23 LAB — LEVETIRACETAM SERPL-MCNC: 45 UG/ML (ref 12–46)

## 2019-11-25 ENCOUNTER — OFFICE VISIT (OUTPATIENT)
Dept: NEUROLOGY | Facility: MEDICAL CENTER | Age: 24
End: 2019-11-25
Payer: COMMERCIAL

## 2019-11-25 VITALS
WEIGHT: 201 LBS | OXYGEN SATURATION: 98 % | BODY MASS INDEX: 37.95 KG/M2 | SYSTOLIC BLOOD PRESSURE: 106 MMHG | HEIGHT: 61 IN | HEART RATE: 75 BPM | DIASTOLIC BLOOD PRESSURE: 60 MMHG | TEMPERATURE: 98.6 F | RESPIRATION RATE: 16 BRPM

## 2019-11-25 DIAGNOSIS — R41.3 MEMORY DEFICIT: ICD-10-CM

## 2019-11-25 DIAGNOSIS — G43.009 MIGRAINE WITHOUT AURA AND WITHOUT STATUS MIGRAINOSUS, NOT INTRACTABLE: ICD-10-CM

## 2019-11-25 DIAGNOSIS — G40.909 SEIZURE DISORDER (HCC): ICD-10-CM

## 2019-11-25 PROCEDURE — 99214 OFFICE O/P EST MOD 30 MIN: CPT | Performed by: NURSE PRACTITIONER

## 2019-11-25 RX ORDER — ZONISAMIDE 100 MG/1
CAPSULE ORAL
Qty: 180 CAP | Refills: 2 | Status: SHIPPED | OUTPATIENT
Start: 2019-11-25 | End: 2020-04-02

## 2019-11-25 ASSESSMENT — ENCOUNTER SYMPTOMS
DOUBLE VISION: 0
ABDOMINAL PAIN: 0
HEADACHES: 0
DIARRHEA: 0
SORE THROAT: 0
MUSCULOSKELETAL NEGATIVE: 1
NERVOUS/ANXIOUS: 0
CONSTITUTIONAL NEGATIVE: 1
NAUSEA: 0
SEIZURES: 0
DEPRESSION: 0
COUGH: 0
VOMITING: 0

## 2019-11-25 NOTE — PROGRESS NOTES
"Subjective:      Laura Rosario is a 24 y.o. female who presents with Follow-Up (Seizure disorder )            HPI   Has transitioned onto Keppra taking 750mg tablets, 1500-2250mg. She is noticing that she feels like she is \"ready\" for the evening dose of Keppra before it is time.  She does not feel as protected from having a seizure.  There is a mass delay in production and Keppra XR is not available in our community currently.    Seizures: last know was 8/282/2018.  Doing well with the Zonegran dose of 200-400mg since last appointment.     Did have a near syncopal episode during the wedding day.  Simply felt that her shoes were too tight and couldn't breathe.     Migraines:  No recent migraine.  Will have a tension headache from time to time.     Memory:  Clarity has improved.  She does not seem as irritable and able to converse clearly.     She is now  as of August 10, 2019.  She has returned from her honeymoon and settling into  life.    9/2018 Brain MRI normal.     6/2018 INTERPRETATION:  This is an abnormal routine EEG recording in the awake, drowsy, and sleep state(s).  Rare bursts of generalized, poorly formed, 3-4 HZ spike and slow wave complexes. Rare independent left and right temporal sharps with intermittent slowing. The findings may suggest: 1)-A primary generalized epilepsy, with fragmented discharges, 2)-Mixed generalized and focal epilepsy (rare), or 3)-Multifocal epilepsy with fast generalization. Consider obtaining a prolonged study if clinically indicated. No typical events were captured during the study. Clinical and radiological correlation is recommended.     Ref. Range 11/21/2019 12:42   Keppra Latest Ref Range: 12 - 46 ug/mL 45   25-Hydroxy   Vitamin D 25 Latest Ref Range: 30 - 100 ng/mL 19 (L)        Current Outpatient Medications   Medication Sig Dispense Refill   • levetiracetam (KEPPRA) 750 MG tablet Take 2 tablets po qam and 3 tablets po qhs. 150 Tab 2   • zonisamide " "(ZONEGRAN) 100 MG Cap TAKE 2 CAPSULES BY MOUTH EVERY MORNING AND 4 CAPSULES EVERY NIGHT AT BEDTIME 180 Cap 0   • zolmitriptan (ZOMIG-ZMT) 5 MG disintegrating tablet Take 1/2-1 tablet po at onset of headache, may repeat dose in 2 hours if unrelieved.  Do not exceed more than 2 tablets in 24 hours. 9 Tab 5   • AMETHIA LO 0.1-0.02 & 0.01 MG Tab TK 1 T PO D  3   • ibuprofen (MOTRIN) 800 MG Tab TK 1 T PO Q 8 H PRF ORTHOPEDIC PAIN OR HEADACHE  4   • vitamin D (CHOLECALCIFEROL) 1000 UNIT Tab Take 1,000 Units by mouth every day.     • Cyanocobalamin (B-12 PO) Take  by mouth.     • Levetiracetam 750 MG TABLET SR 24 HR TAKE 2 TABLETS BY MOUTH EVERY MORNING AND 3 TABLETS EVERY NIGHT AT BEDTIME (Patient not taking: Reported on 11/25/2019) 150 Tab 5     No current facility-administered medications for this visit.          Review of Systems   Constitutional: Negative.  Weight loss: weight gain 7 pounds.   HENT: Negative for hearing loss, nosebleeds and sore throat.         No recent head injury.   Eyes: Negative for double vision.        No new loss of vision.   Respiratory: Negative for cough.         No recent lung infections.   Cardiovascular: Negative for chest pain.   Gastrointestinal: Negative for abdominal pain, diarrhea, nausea and vomiting.   Genitourinary: Negative.    Musculoskeletal: Negative.    Skin: Negative.    Neurological: Negative for seizures and headaches.   Endo/Heme/Allergies:        No history of endocrine dysfunction.  No new problems.   Psychiatric/Behavioral: Negative for depression. The patient is not nervous/anxious.         No recent mood changes.          Objective:     /60 (BP Location: Left arm, Patient Position: Sitting, BP Cuff Size: Adult)   Pulse 75   Temp 37 °C (98.6 °F) (Temporal)   Resp 16   Ht 1.558 m (5' 1.34\")   Wt 91.2 kg (201 lb)   SpO2 98%   BMI 37.56 kg/m²      Physical Exam  Constitutional:       Appearance: She is well-developed.   HENT:      Head: Normocephalic and " atraumatic.   Eyes:      Pupils: Pupils are equal, round, and reactive to light.   Neck:      Musculoskeletal: Normal range of motion.   Cardiovascular:      Rate and Rhythm: Normal rate and regular rhythm.   Pulmonary:      Effort: Pulmonary effort is normal.   Musculoskeletal: Normal range of motion.   Skin:     General: Skin is warm.   Neurological:      Mental Status: She is alert and oriented to person, place, and time.      Motor: No abnormal muscle tone.      Gait: Gait normal.      Comments: No observable changes in neurologic status.  See initial new patient examination for details.    Psychiatric:         Mood and Affect: Mood normal.             Assessment/Plan:     Seizure disorder, probable generalized epilepsy versus mixed type:  May 2018 through August 3rd she has had at least 4 episodes concerning for seizures.     8/3/2018 Nocturnal seizure, bit tongue, found face down in her pillow.     8/28/2018: longer amnestic period and recovery time with seizure occurring before bed and before taking night dose of AED.     August 10, 2019 near-syncope episode on wedding day.  No concerns for seizures now for 15 months.     Migraine headaches:  Not a concern at this time.    DMV form completed today.    Continue Leviteracetam with change off of extended release due to outage, 1500mg-2250mg and ZNS 200-400mg. May consider taking [id number] for enhanced serum stability.     Continue using birth control, taking MVI and Vit D/Calcium supplements.      Counseled regarding general health, diet, exercise, and rest.    Return for follow-up in 6 months or sooner if needed.    EDUCATION AND COUNSELING:  -Education was provided to the patient and/or family regarding diagnosis and prognosis. The chronic and unpredictable nature of the condition was discussed. There is increased risk for additional events, which may carry potential for significant injuries and death.    -We reviewed the current antiepileptic  regimen. Potential side effects of antiepileptics were discussed at length, including but no limited to: hypersensitivity reactions (rash and others, some of which can be fatal), visual field changes (some of which may be irreversible), glaucoma, diplopia, kidney stones, osteopenia/osteoporosis/bone fractures, hyperthermia/anhydrosis, tremors, ataxia, dizziness, fatigue, increased risk for falls, cardiac arrhythmias/syncope, gastrointestinal (hepatitis, pancreatitis, gastritis, ulcers), gingival hypertrophy, drowsiness, sedation, anxiety/nervousness, increased risk for suicide, increased risk for depression, and psychosis. We reviewed drug-drug interactions and their potential effect on seizure control and medication side effects.    -Patient/family educated on SUDEP (Sudden Death in Epilepsy). Counseling was provided on the importance of strict medication and follow up compliance. The patient understands the risks associated with non-adherence with the medical plan as outlined, including but not limited to an increased risk for breakthrough seizures, which may contribute to injuries, disability, status epilepticus, and even death.    -Counseling was also provided on potential effects of alcohol and other drugs, which may lower seizure threshold and/or affect the metabolism of antiepileptic drugs. I recommend avoidance of alcohol and illegal drugs.  -Recommend proper hydration, regular exercise, proper sleep hygiene (7-8 hrs of overnight sleep, avoid sleep deprivation).    -I have made the patient aware of mandatory reporting required by the law in the State [place] regarding episodes of seizures, loss of consciousness, and/or alteration of awareness. The patient and family are responsible for reporting events to the DMV, instructions were provided. The patient verbalized understanding and states she has not been driving. Other seizure precautions were discussed at length, including no diving, no skydiving, no  unsupervised swimming, no Jacuzzi or bathing in bathtubs.    -She is ok to return to work but cannot operate any heavy machinery, he verbalized understanding.      Pt agrees with plan.

## 2019-12-02 NOTE — MR AVS SNAPSHOT
"        Laura Rosario   2017 10:00 AM   Office Visit   MRN: [id number]    Department:  Neurology [facility]   Dept Phone:  [phone]    Description:  Female : 1995   Provider:  CORNELIUS Anderson           Reason for Visit     Follow-Up Seizure disorder (CMS-HCC)      Allergies as of 2017     No Known Allergies      You were diagnosed with     Seizure disorder (CMS-HCC)   [260612]       Migraine without aura and without status migrainosus, not intractable   [534587]         Vital Signs     Blood Pressure Pulse Temperature Respirations Height Weight    106/64 mmHg 80 36.5 °C (97.7 °F) 16 1.575 m (5' 2.01\") 86.818 kg (191 lb 6.4 oz)    Body Mass Index Oxygen Saturation Smoking Status             35.00 kg/m2 95% Never Smoker          Basic Information     Date Of Birth Sex Race Ethnicity Preferred Language    1995 Female White Non- English      Your appointments     Aug 28, 2017  9:00 AM   Follow Up Visit with CORNELIUS Anderson   [facility] (--)    [address] [place] [address], [address]  [facility] [id number] [phone]           You will be receiving a confirmation call a few days before your appointment from our automated call confirmation system.              Problem List              ICD-10-CM Priority Class Noted - Resolved    Migraine without aura and without status migrainosus, not intractable G43.009   2016 - Present    BMI 32.0-32.9,adult Z68.32   2016 - Present    Uses birth control Z30.9   2016 - Present    Left upper arm pain M79.622   2016 - Present      Health Maintenance        Date Due Completion Dates    IMM VARICELLA (CHICKENPOX) VACCINE (1 of 2 - 2 Dose Adolescent Series) 2006 ---    IMM INFLUENZA (1) 2016 ---    PAP SMEAR 2017    IMM DTaP/Tdap/Td Vaccine (7 - Td) 2018, 1997, 2/15/1995, 1994, 1994, 1/10/1994            Current Immunizations     Dtap Vaccine 1997, " Patient is requesting a call back to discuss this medication.    Callback: [phone]   2/15/1995, 7/5/1994, 4/7/1994, 1/10/1994    HIB Vaccine (ACTHIB/HIBERIX) 2/15/1995, 7/5/1994, 4/7/1994, 1/10/1994    HPV Quadrivalent Vaccine (GARDASIL) 11/21/2012, 8/1/2012, 5/31/2012    Hepatitis A Vaccine, Ped/Adol 6/30/2010, 7/15/2008    Hepatitis B Vaccine Non-Recombivax (Ped/Adol) 7/5/1994, 4/7/1994, 1/10/1994    MMR Vaccine 12/8/1997, 2/15/1995    Tdap Vaccine 1/28/2008      Below and/or attached are the medications your provider expects you to take. Review all of your home medications and newly ordered medications with your provider and/or pharmacist. Follow medication instructions as directed by your provider and/or pharmacist. Please keep your medication list with you and share with your provider. Update the information when medications are discontinued, doses are changed, or new medications (including over-the-counter products) are added; and carry medication information at all times in the event of emergency situations     Allergies:  No Known Allergies          Medications  Valid as of: February 27, 2017 - 10:15 AM    Generic Name Brand Name Tablet Size Instructions for use    Cholecalciferol (Tab) cholecalciferol 1000 UNIT Take 1,000 Units by mouth every day.        Cyanocobalamin   Take  by mouth.        LevETIRAcetam (TABLET SR 24 HR) Levetiracetam 750 MG Take two tablets po qam and three tablets po qhs.        LORazepam (Tab) ATIVAN 1 MG Take 1/2-1 tablet PO PRN breakthrough seizures.  Do not exceed more than 2 tablets in 24 hours unless directed otherwise by physician.        Multiple Vitamin   Take  by mouth.        Norgestimate-Eth Estradiol (Tab) MONONESSA 0.25-35 MG-MCG TAKE 1 TABLET BY MOUTH EVERY DAY        Pyridoxine HCl   Take  by mouth.        Pyridoxine HCl (Tab) pyridoxine 100 MG Take 1 Tab by mouth every day.        .                 Medicines prescribed today were sent to:     [id number] - [place], [place] [address]    [address] [place]  [place] [address]    Phone: [phone] Fax: [phone]    Open 24 Hours?: No    [id number] - [place], [place] - [address]    [address] [address]    Phone: [phone] Fax: [phone]    Open 24 Hours?: No      Medication refill instructions:       If your prescription bottle indicates you have medication refills left, it is not necessary to call your provider’s office. Please contact your pharmacy and they will refill your medication.    If your prescription bottle indicates you do not have any refills left, you may request refills at any time through one of the following ways: The online [employer] system (except Urgent Care), by calling your provider’s office, or by asking your pharmacy to contact your provider’s office with a refill request. Medication refills are processed only during regular business hours and may not be available until the next business day. Your provider may request additional information or to have a follow-up visit with you prior to refilling your medication.   *Please Note: Medication refills are assigned a new Rx number when refilled electronically. Your pharmacy may indicate that no refills were authorized even though a new prescription for the same medication is available at the pharmacy. Please request the medicine by name with the pharmacy before contacting your provider for a refill.        Referral     A referral request has been sent to our patient care coordination department. Please allow 3-5 business days for us to process this request and contact you either by phone or mail. If you do not hear from us by the 5th business day, please call us at [phone].           [employer] Access Code: Activation code not generated  Current [employer] Status: Active

## 2020-02-10 ENCOUNTER — TELEPHONE (OUTPATIENT)
Dept: NEUROLOGY | Facility: MEDICAL CENTER | Age: 25
End: 2020-02-10

## 2020-05-06 ENCOUNTER — TELEMEDICINE (OUTPATIENT)
Dept: NEUROLOGY | Facility: MEDICAL CENTER | Age: 25
End: 2020-05-06
Payer: COMMERCIAL

## 2020-05-06 VITALS — HEIGHT: 61 IN | BODY MASS INDEX: 37.95 KG/M2 | WEIGHT: 201 LBS

## 2020-05-06 DIAGNOSIS — R41.3 MEMORY DEFICIT: ICD-10-CM

## 2020-05-06 DIAGNOSIS — G43.009 MIGRAINE WITHOUT AURA AND WITHOUT STATUS MIGRAINOSUS, NOT INTRACTABLE: ICD-10-CM

## 2020-05-06 DIAGNOSIS — G40.909 SEIZURE DISORDER (HCC): ICD-10-CM

## 2020-05-06 PROCEDURE — 99214 OFFICE O/P EST MOD 30 MIN: CPT | Mod: 95,CR | Performed by: NURSE PRACTITIONER

## 2020-05-06 RX ORDER — LEVETIRACETAM 750 MG/1
TABLET ORAL
Qty: 450 TAB | Refills: 1 | Status: SHIPPED | OUTPATIENT
Start: 2020-05-06 | End: 2020-11-30 | Stop reason: SDUPTHER

## 2020-05-06 RX ORDER — ZOLMITRIPTAN 5 MG/1
TABLET, ORALLY DISINTEGRATING ORAL
Qty: 9 TAB | Refills: 2 | Status: SHIPPED | OUTPATIENT
Start: 2020-05-06

## 2020-05-06 ASSESSMENT — FIBROSIS 4 INDEX: FIB4 SCORE: 0.64

## 2020-05-06 NOTE — PROGRESS NOTES
Telemedicine Visit: Established Patient     This encounter was conducted via Zoom .   Verbal consent was obtained. Patient's identity was verified.    Subjective:   CC: Follow-up for Seizure Disorder  Laura Rosario is a 24 y.o. female presenting for evaluation and management of:      Seizures: last know was 8/282/2018.    Currently taking 750mg tablets, 1500-2250mg, she has been unable to obtain the Keppra regular instead of XR option.  She has adjusted to this dosing.  Prefers to use the extended in the future.     Doing well with the Zonegran dose of 200-400mg since last appointment.    Did have a near syncopal episode during the wedding day. Simply felt that her shoes were too tight and couldn't breathe.     Migraines:  Migraines are infrequent. The pain onset is the right occipital region.  She has had about one per month for the past 5 months.  Had a bad, three day migraine in the past few months.  Unrelieved with NSAID and zomig.      Memory:  Clarity has improved.  She does not seem as irritable and able to converse clearly.     She is now  as of August 10, 2019.  She has returned from her honeymoon and settling into  life.     Working full-time and [employer] is laid off and looking for a new job.    9/2018 Brain MRI normal.     6/2018 INTERPRETATION:  This is an abnormal routine EEG recording in the awake, drowsy, and sleep state(s).  Rare bursts of generalized, poorly formed, 3-4 HZ spike and slow wave complexes. Rare independent left and right temporal sharps with intermittent slowing. The findings may suggest: 1)-A primary generalized epilepsy, with fragmented discharges, 2)-Mixed generalized and focal epilepsy (rare), or 3)-Multifocal epilepsy with fast generalization. Consider obtaining a prolonged study if clinically indicated. No typical events were captured during the study. Clinical and radiological correlation is recommended.       Ref. Range 11/21/2019 12:42   Keppra Latest Ref  Range: 12 - 46 ug/mL 45   25-Hydroxy   Vitamin D 25 Latest Ref Range: 30 - 100 ng/mL 19 (L)           ROS   Denies any recent fevers or chills. No nausea or vomiting. No chest pains or shortness of breath.     No Known Allergies    Current medicines (including changes today)  Current Outpatient Medications   Medication Sig Dispense Refill   • levetiracetam (KEPPRA) 750 MG tablet TAKE 2 TABLETS BY MOUTH EVERY MORNING, AND 3 TABLETS AT BEDTIME 150 Tab 0   • zonisamide (ZONEGRAN) 100 MG Cap TAKE 2 CAPSULES BY MOUTH EVERY MORNING AND 4 CAPSULES AT BEDTIME 180 Cap 2   • zolmitriptan (ZOMIG-ZMT) 5 MG disintegrating tablet Take 1/2-1 tablet po at onset of headache, may repeat dose in 2 hours if unrelieved.  Do not exceed more than 2 tablets in 24 hours. 9 Tab 5   • AMETHIA LO 0.1-0.02 & 0.01 MG Tab TK 1 T PO D  3   • ibuprofen (MOTRIN) 800 MG Tab TK 1 T PO Q 8 H PRF ORTHOPEDIC PAIN OR HEADACHE  4   • vitamin D (CHOLECALCIFEROL) 1000 UNIT Tab Take 1,000 Units by mouth every day.     • Cyanocobalamin (B-12 PO) Take  by mouth.     • Levetiracetam 750 MG TABLET SR 24 HR TAKE 2 TABLETS BY MOUTH EVERY MORNING AND 3 TABLETS EVERY NIGHT AT BEDTIME (Patient not taking: Reported on 11/25/2019) 150 Tab 5     No current facility-administered medications for this visit.        Patient Active Problem List    Diagnosis Date Noted   • Seizure disorder (HCC) 11/19/2018   • Memory deficit 11/19/2018   • Left upper arm pain 07/19/2016   • Migraine without aura and without status migrainosus, not intractable 01/21/2016   • Uses birth control 01/21/2016       Family History   Problem Relation Age of Onset   • Genetic Disorder Brother         migraine   • Diabetes Maternal Grandfather    • Heart Disease Maternal Grandfather    • Hypertension Maternal Grandfather        She  has a past medical history of Pneumonia (2011) and Seizure disorder (HCC).  She  has a past surgical history that includes dental extraction(s) (7/2015) and tonsillectomy  and adenoidectomy (Bilateral, 1/8/2016).       Objective:   Vitals obtained by patient:  Weight 201  HR normal    Physical Exam:  Constitutional: Alert, no distress, well-groomed.  Skin: No rashes in visible areas.  Eye: Round. Conjunctiva clear, lids normal. No icterus.   ENMT: Lips pink without lesions, good dentition, moist mucous membranes. Phonation normal.  Neck: No masses, no thyromegaly. Moves freely without pain.  CV: Pulse as reported by patient  Respiratory: Unlabored respiratory effort, no cough or audible wheeze  Psych: Alert and oriented x3, normal affect and mood.       Assessment and Plan:   The following treatment plan was discussed:     Seizure disorder, probable generalized epilepsy versus mixed type:  May 2018 through August 3rd she has had at least 4 episodes concerning for seizures.     8/3/2018 Nocturnal seizure, bit tongue, found face down in her pillow.     8/28/2018: longer amnestic period and recovery time with seizure occurring before bed and before taking night dose of AED.     August 10, 2019 near-syncope episode on wedding day.  No concerns for seizures now for 15 months.     Migraine headaches:  Occurring approximately one time per month.    She will be quite impaired with the migraines.  No apparent cyclical component.  Continue Zomig ZMT tablets.     Continue Leviteracetam with change off of extended release due to outage, 1500mg-2250mg and ZNS 200-400mg. May consider taking [id number] for enhanced serum stability.     Continue using birth control, taking MVI and Vit D/Calcium supplements.      Counseled regarding general health, diet, exercise, and rest.    Follow-up: Return for follow-up in 6 months or sooner if needed.     EDUCATION AND COUNSELING:  -Education was provided to the patient and/or family regarding diagnosis and prognosis. The chronic and unpredictable nature of the condition was discussed. There is increased risk for additional events, which may carry  potential for significant injuries and death.    -We reviewed the current antiepileptic regimen. Potential side effects of antiepileptics were discussed at length, including but no limited to: hypersensitivity reactions (rash and others, some of which can be fatal), visual field changes (some of which may be irreversible), glaucoma, diplopia, kidney stones, osteopenia/osteoporosis/bone fractures, hyperthermia/anhydrosis, tremors, ataxia, dizziness, fatigue, increased risk for falls, cardiac arrhythmias/syncope, gastrointestinal (hepatitis, pancreatitis, gastritis, ulcers), gingival hypertrophy, drowsiness, sedation, anxiety/nervousness, increased risk for suicide, increased risk for depression, and psychosis. We reviewed drug-drug interactions and their potential effect on seizure control and medication side effects.    -Patient/family educated on SUDEP (Sudden Death in Epilepsy). Counseling was provided on the importance of strict medication and follow up compliance. The patient understands the risks associated with non-adherence with the medical plan as outlined, including but not limited to an increased risk for breakthrough seizures, which may contribute to injuries, disability, status epilepticus, and even death.    -Counseling was also provided on potential effects of alcohol and other drugs, which may lower seizure threshold and/or affect the metabolism of antiepileptic drugs. I recommend avoidance of alcohol and illegal drugs.  -Recommend proper hydration, regular exercise, proper sleep hygiene (7-8 hrs of overnight sleep, avoid sleep deprivation).    -I have made the patient aware of mandatory reporting required by the law in the State [place] regarding episodes of seizures, loss of consciousness, and/or alteration of awareness. The patient and family are responsible for reporting events to the DMV, instructions were provided. The patient verbalized understanding and states she has not been driving. Other  seizure precautions were discussed at length, including no diving, no skydiving, no unsupervised swimming, no Jacuzzi or bathing in bathtubs.    -She is ok to return to work but cannot operate any heavy machinery, he verbalized understanding.      Pt agrees with plan.

## 2020-08-10 ENCOUNTER — HOSPITAL ENCOUNTER (OUTPATIENT)
Dept: LAB | Facility: MEDICAL CENTER | Age: 25
End: 2020-08-10
Attending: INTERNAL MEDICINE
Payer: COMMERCIAL

## 2020-08-10 LAB
25(OH)D3 SERPL-MCNC: 33 NG/ML (ref 30–100)
ALBUMIN SERPL BCP-MCNC: 4.3 G/DL (ref 3.2–4.9)
ALBUMIN/GLOB SERPL: 1.3 G/DL
ALP SERPL-CCNC: 66 U/L (ref 30–99)
ALT SERPL-CCNC: 14 U/L (ref 2–50)
ANION GAP SERPL CALC-SCNC: 15 MMOL/L (ref 7–16)
AST SERPL-CCNC: 15 U/L (ref 12–45)
BASOPHILS # BLD AUTO: 0.4 % (ref 0–1.8)
BASOPHILS # BLD: 0.03 K/UL (ref 0–0.12)
BILIRUB SERPL-MCNC: 0.2 MG/DL (ref 0.1–1.5)
BUN SERPL-MCNC: 13 MG/DL (ref 8–22)
CALCIUM SERPL-MCNC: 9.2 MG/DL (ref 8.5–10.5)
CHLORIDE SERPL-SCNC: 107 MMOL/L (ref 96–112)
CHOLEST SERPL-MCNC: 180 MG/DL (ref 100–199)
CO2 SERPL-SCNC: 15 MMOL/L (ref 20–33)
CREAT SERPL-MCNC: 0.8 MG/DL (ref 0.5–1.4)
EOSINOPHIL # BLD AUTO: 0.03 K/UL (ref 0–0.51)
EOSINOPHIL NFR BLD: 0.4 % (ref 0–6.9)
ERYTHROCYTE [DISTWIDTH] IN BLOOD BY AUTOMATED COUNT: 40.6 FL (ref 35.9–50)
FASTING STATUS PATIENT QL REPORTED: NORMAL
GLOBULIN SER CALC-MCNC: 3.4 G/DL (ref 1.9–3.5)
GLUCOSE SERPL-MCNC: 84 MG/DL (ref 65–99)
HCT VFR BLD AUTO: 45.3 % (ref 37–47)
HDLC SERPL-MCNC: 48 MG/DL
HGB BLD-MCNC: 15.3 G/DL (ref 12–16)
IMM GRANULOCYTES # BLD AUTO: 0.01 K/UL (ref 0–0.11)
IMM GRANULOCYTES NFR BLD AUTO: 0.1 % (ref 0–0.9)
LDLC SERPL CALC-MCNC: 116 MG/DL
LYMPHOCYTES # BLD AUTO: 3.3 K/UL (ref 1–4.8)
LYMPHOCYTES NFR BLD: 40.2 % (ref 22–41)
MCH RBC QN AUTO: 32 PG (ref 27–33)
MCHC RBC AUTO-ENTMCNC: 33.8 G/DL (ref 33.6–35)
MCV RBC AUTO: 94.8 FL (ref 81.4–97.8)
MONOCYTES # BLD AUTO: 0.59 K/UL (ref 0–0.85)
MONOCYTES NFR BLD AUTO: 7.2 % (ref 0–13.4)
NEUTROPHILS # BLD AUTO: 4.24 K/UL (ref 2–7.15)
NEUTROPHILS NFR BLD: 51.7 % (ref 44–72)
NRBC # BLD AUTO: 0 K/UL
NRBC BLD-RTO: 0 /100 WBC
PLATELET # BLD AUTO: 164 K/UL (ref 164–446)
PMV BLD AUTO: 12.3 FL (ref 9–12.9)
POTASSIUM SERPL-SCNC: 4.1 MMOL/L (ref 3.6–5.5)
PROT SERPL-MCNC: 7.7 G/DL (ref 6–8.2)
RBC # BLD AUTO: 4.78 M/UL (ref 4.2–5.4)
SODIUM SERPL-SCNC: 137 MMOL/L (ref 135–145)
T4 FREE SERPL-MCNC: 1.08 NG/DL (ref 0.93–1.7)
TRIGL SERPL-MCNC: 82 MG/DL (ref 0–149)
TSH SERPL DL<=0.005 MIU/L-ACNC: 2.36 UIU/ML (ref 0.38–5.33)
VIT B12 SERPL-MCNC: 250 PG/ML (ref 211–911)
WBC # BLD AUTO: 8.2 K/UL (ref 4.8–10.8)

## 2020-08-10 PROCEDURE — 84439 ASSAY OF FREE THYROXINE: CPT

## 2020-08-10 PROCEDURE — 85025 COMPLETE CBC W/AUTO DIFF WBC: CPT

## 2020-08-10 PROCEDURE — 82607 VITAMIN B-12: CPT

## 2020-08-10 PROCEDURE — 84443 ASSAY THYROID STIM HORMONE: CPT

## 2020-08-10 PROCEDURE — 36415 COLL VENOUS BLD VENIPUNCTURE: CPT

## 2020-08-10 PROCEDURE — 80061 LIPID PANEL: CPT

## 2020-08-10 PROCEDURE — 80053 COMPREHEN METABOLIC PANEL: CPT

## 2020-08-10 PROCEDURE — 82306 VITAMIN D 25 HYDROXY: CPT

## 2020-11-30 ENCOUNTER — OFFICE VISIT (OUTPATIENT)
Dept: NEUROLOGY | Facility: MEDICAL CENTER | Age: 25
End: 2020-11-30
Payer: COMMERCIAL

## 2020-11-30 VITALS
HEART RATE: 120 BPM | DIASTOLIC BLOOD PRESSURE: 68 MMHG | OXYGEN SATURATION: 94 % | SYSTOLIC BLOOD PRESSURE: 108 MMHG | BODY MASS INDEX: 40.46 KG/M2 | WEIGHT: 214.29 LBS | HEIGHT: 61 IN | RESPIRATION RATE: 16 BRPM | TEMPERATURE: 98.6 F

## 2020-11-30 DIAGNOSIS — G43.009 MIGRAINE WITHOUT AURA AND WITHOUT STATUS MIGRAINOSUS, NOT INTRACTABLE: ICD-10-CM

## 2020-11-30 DIAGNOSIS — R41.3 MEMORY DEFICIT: ICD-10-CM

## 2020-11-30 DIAGNOSIS — G40.909 SEIZURE DISORDER (HCC): ICD-10-CM

## 2020-11-30 DIAGNOSIS — Z30.09 FAMILY PLANNING COUNSELING: ICD-10-CM

## 2020-11-30 PROCEDURE — 99214 OFFICE O/P EST MOD 30 MIN: CPT | Performed by: NURSE PRACTITIONER

## 2020-11-30 RX ORDER — LEVETIRACETAM 750 MG/1
TABLET ORAL
Qty: 450 TAB | Refills: 1 | Status: SHIPPED | OUTPATIENT
Start: 2020-11-30 | End: 2021-08-02

## 2020-11-30 RX ORDER — ZONISAMIDE 100 MG/1
CAPSULE ORAL
Qty: 540 CAP | Refills: 1 | Status: SHIPPED | OUTPATIENT
Start: 2020-11-30 | End: 2021-04-05 | Stop reason: SDUPTHER

## 2020-11-30 ASSESSMENT — ENCOUNTER SYMPTOMS
HEADACHES: 0
SORE THROAT: 0
VOMITING: 0
NAUSEA: 0
CONSTITUTIONAL NEGATIVE: 1
DEPRESSION: 0
MUSCULOSKELETAL NEGATIVE: 1
COUGH: 0
SEIZURES: 0
DIARRHEA: 0
DOUBLE VISION: 0
ABDOMINAL PAIN: 0
NERVOUS/ANXIOUS: 0

## 2020-11-30 ASSESSMENT — FIBROSIS 4 INDEX: FIB4 SCORE: 0.66

## 2020-11-30 ASSESSMENT — PATIENT HEALTH QUESTIONNAIRE - PHQ9: CLINICAL INTERPRETATION OF PHQ2 SCORE: 0

## 2020-11-30 NOTE — PROGRESS NOTES
"Subjective:      Laura Rosario is a 25 y.o. female who presents with Follow-Up (Seizure disorder )          HPI  Seizures: last known was 8/28/2018.     Currently taking 750mg tablets, 1500-2250mg, she has been unable to obtain the Keppra regular instead of XR option.  She has adjusted to this dosing.  Prefers to use the extended in the future.     Doing well with the Zonegran dose of 200-400mg.     Migraines:  Has had some migraines during the night, as if she has a \"pinched nerve\" on the left shoulder and neck.  The pain onset occurs about one time per month and may last 1-2 days.      Noticing eye strain with working from home.  Trying to wear her glasses as much when on the computer.  Investing in some blue glasses to reduce glare when on the computer.     Memory:  Clarity has improved.  She does not seem as irritable and able to converse clearly.     She was  August 10, 2019. Working full-time and [employer] has now found an improved job.  Beginning to family plan and consider stopping her birth control summer 2021.     9/2018 Brain MRI normal.     6/2018 INTERPRETATION:  This is an abnormal routine EEG recording in the awake, drowsy, and sleep state(s).  Rare bursts of generalized, poorly formed, 3-4 HZ spike and slow wave complexes. Rare independent left and right temporal sharps with intermittent slowing. The findings may suggest: 1)-A primary generalized epilepsy, with fragmented discharges, 2)-Mixed generalized and focal epilepsy (rare), or 3)-Multifocal epilepsy with fast generalization. Consider obtaining a prolonged study if clinically indicated. No typical events were captured during the study. Clinical and radiological correlation is recommended.       Ref. Range 11/21/2019 12:42   Keppra Latest Ref Range: 12 - 46 ug/mL 45   25-Hydroxy   Vitamin D 25 Latest Ref Range: 30 - 100 ng/mL 19 (L)      Current Outpatient Medications   Medication Sig Dispense Refill   • zonisamide (ZONEGRAN) 100 MG Cap " "Take 2 capsules PO qam and 4 capsules PO qhs. 540 Cap 1   • levetiracetam (KEPPRA) 750 MG tablet TAKE 2 TABLETS BY MOUTH EVERY MORNING, AND 3 TABLETS AT BEDTIME 450 Tab 1   • zolmitriptan (ZOMIG-ZMT) 5 MG disintegrating tablet Take 1/2-1 tablet po at onset of headache, may repeat dose in 2 hours if unrelieved.  Do not exceed more than 2 tablets in 24 hours. 9 Tab 2   • AMETHIA LO 0.1-0.02 & 0.01 MG Tab TK 1 T PO D  3   • ibuprofen (MOTRIN) 800 MG Tab TK 1 T PO Q 8 H PRF ORTHOPEDIC PAIN OR HEADACHE  4   • vitamin D (CHOLECALCIFEROL) 1000 UNIT Tab Take 1,000 Units by mouth every day.     • Cyanocobalamin (B-12 PO) Take  by mouth.       No current facility-administered medications for this visit.          Review of Systems   Constitutional: Negative.  Weight loss: weight gain.   HENT: Negative for hearing loss, nosebleeds and sore throat.         No recent head injury.   Eyes: Negative for double vision.        No new loss of vision.   Respiratory: Negative for cough.         No recent lung infections.   Cardiovascular: Negative for chest pain.   Gastrointestinal: Negative for abdominal pain, diarrhea, nausea and vomiting.   Genitourinary: Negative.    Musculoskeletal: Negative.    Skin: Negative.    Neurological: Negative for seizures and headaches.   Endo/Heme/Allergies:        No history of endocrine dysfunction.  No new problems.   Psychiatric/Behavioral: Negative for depression. The patient is not nervous/anxious.         No recent mood changes.          Objective:     /68 (BP Location: Left arm, Patient Position: Sitting, BP Cuff Size: Adult)   Pulse (!) 120   Temp 37 °C (98.6 °F) (Temporal)   Resp 16   Ht 1.558 m (5' 1.34\")   Wt 97.2 kg (214 lb 4.6 oz)   SpO2 94%   BMI 40.04 kg/m²      Physical Exam  Constitutional:       Appearance: She is well-developed.   HENT:      Head: Normocephalic and atraumatic.      Nose: Nose normal.   Eyes:      Pupils: Pupils are equal, round, and reactive to light. "   Neck:      Musculoskeletal: Normal range of motion.   Cardiovascular:      Rate and Rhythm: Normal rate and regular rhythm.   Pulmonary:      Effort: Pulmonary effort is normal.   Musculoskeletal: Normal range of motion.   Skin:     General: Skin is warm.   Neurological:      Mental Status: She is alert and oriented to person, place, and time.      Motor: No abnormal muscle tone.      Gait: Gait normal.      Comments: No observable changes in neurologic status.  See initial new patient examination for details.    Psychiatric:         Mood and Affect: Mood normal.            Assessment/Plan:        Seizure disorder, probable generalized epilepsy versus mixed type:  May 2018 through August 3rd she had at least 4 episodes concerning for seizures.     8/3/2018 Nocturnal seizure, bit tongue, found face down in her pillow.     8/28/2018: longer amnestic period and recovery time with seizure occurring before bed and before taking night dose of AED.     August 10, 2019 near-syncope episode on wedding day.  No concerns for seizures now for 15 months.     Migraine headaches:  Occurring approximately one time per month.    She will be quite impaired with the migraines.  No apparent cyclical component.  Continue Zomig ZMT tablets which provides effective relief..     Continue Leviteracetam with change off of extended release due to outage, 1500mg-2250mg and ZNS 200-400mg. May consider taking [id number] for enhanced serum stability.     Continue using birth control, taking MVI and Vit D/Calcium supplements.      Counseled regarding general health, diet, exercise, and rest.  Counseled at length regarding family planning.  Recommend she begin a PNV daily and folic acid 2mg daily.  To call if/when she becomes pregnant.  Will establish with an OB/GYN.    Consider drawing labs at next appointment.     Follow-up: Return for follow-up in 4-5 months or sooner if needed.     EDUCATION AND COUNSELING:  -Education was provided  to the patient and/or family regarding diagnosis and prognosis. The chronic and unpredictable nature of the condition was discussed. There is increased risk for additional events, which may carry potential for significant injuries and death.    -We reviewed the current antiepileptic regimen. Potential side effects of antiepileptics were discussed at length, including but no limited to: hypersensitivity reactions (rash and others, some of which can be fatal), visual field changes (some of which may be irreversible), glaucoma, diplopia, kidney stones, osteopenia/osteoporosis/bone fractures, hyperthermia/anhydrosis, tremors, ataxia, dizziness, fatigue, increased risk for falls, cardiac arrhythmias/syncope, gastrointestinal (hepatitis, pancreatitis, gastritis, ulcers), gingival hypertrophy, drowsiness, sedation, anxiety/nervousness, increased risk for suicide, increased risk for depression, and psychosis. We reviewed drug-drug interactions and their potential effect on seizure control and medication side effects.    -Patient/family educated on SUDEP (Sudden Death in Epilepsy). Counseling was provided on the importance of strict medication and follow up compliance. The patient understands the risks associated with non-adherence with the medical plan as outlined, including but not limited to an increased risk for breakthrough seizures, which may contribute to injuries, disability, status epilepticus, and even death.    -Counseling was also provided on potential effects of alcohol and other drugs, which may lower seizure threshold and/or affect the metabolism of antiepileptic drugs. I recommend avoidance of alcohol and illegal drugs.  -Recommend proper hydration, regular exercise, proper sleep hygiene (7-8 hrs of overnight sleep, avoid sleep deprivation).    -I have made the patient aware of mandatory reporting required by the law in the State [facility] regarding episodes of seizures, loss of consciousness, and/or  alteration of awareness. The patient and family are responsible for reporting events to the DMV, instructions were provided. The patient verbalized understanding and states she has not been driving. Other seizure precautions were discussed at length, including no diving, no skydiving, no unsupervised swimming, no Jacuzzi or bathing in bathtubs.    -She is ok to return to work but cannot operate any heavy machinery, he verbalized understanding.      Pt agrees with plan.

## 2020-12-02 PROBLEM — Z30.09 FAMILY PLANNING COUNSELING: Status: ACTIVE | Noted: 2020-12-02

## 2021-04-05 ENCOUNTER — OFFICE VISIT (OUTPATIENT)
Dept: NEUROLOGY | Facility: MEDICAL CENTER | Age: 26
End: 2021-04-05
Attending: NURSE PRACTITIONER
Payer: COMMERCIAL

## 2021-04-05 VITALS
OXYGEN SATURATION: 96 % | WEIGHT: 217.37 LBS | HEIGHT: 61 IN | BODY MASS INDEX: 41.04 KG/M2 | TEMPERATURE: 96.8 F | HEART RATE: 75 BPM | SYSTOLIC BLOOD PRESSURE: 106 MMHG | RESPIRATION RATE: 16 BRPM | DIASTOLIC BLOOD PRESSURE: 72 MMHG

## 2021-04-05 DIAGNOSIS — G43.009 MIGRAINE WITHOUT AURA AND WITHOUT STATUS MIGRAINOSUS, NOT INTRACTABLE: ICD-10-CM

## 2021-04-05 DIAGNOSIS — R79.89 LOW VITAMIN B12 LEVEL: ICD-10-CM

## 2021-04-05 DIAGNOSIS — Z91.89 AT RISK FOR OSTEOPOROSIS: ICD-10-CM

## 2021-04-05 DIAGNOSIS — G40.909 SEIZURE DISORDER (HCC): ICD-10-CM

## 2021-04-05 DIAGNOSIS — Z30.09 FAMILY PLANNING COUNSELING: ICD-10-CM

## 2021-04-05 PROCEDURE — 99214 OFFICE O/P EST MOD 30 MIN: CPT | Performed by: NURSE PRACTITIONER

## 2021-04-05 PROCEDURE — 99211 OFF/OP EST MAY X REQ PHY/QHP: CPT | Performed by: NURSE PRACTITIONER

## 2021-04-05 RX ORDER — ZONISAMIDE 100 MG/1
CAPSULE ORAL
Qty: 540 CAPSULE | Refills: 1 | Status: SHIPPED | OUTPATIENT
Start: 2021-04-05 | End: 2021-11-29

## 2021-04-05 RX ORDER — LEVETIRACETAM 750 MG/1
TABLET, FILM COATED, EXTENDED RELEASE ORAL
Qty: 150 TABLET | Refills: 5 | Status: SHIPPED | OUTPATIENT
Start: 2021-04-05

## 2021-04-05 ASSESSMENT — FIBROSIS 4 INDEX: FIB4 SCORE: 0.66

## 2021-04-05 ASSESSMENT — PATIENT HEALTH QUESTIONNAIRE - PHQ9: CLINICAL INTERPRETATION OF PHQ2 SCORE: 0

## 2021-04-05 NOTE — PROGRESS NOTES
"Subjective:      Laura Rosario is a 25 y.o. female who presents with Follow-Up (Seizure disorder )          HPI   Seizures: last known was 8/28/2018.     Currently taking 750mg tablets, 1500-2250mg, she has been unable to obtain the Keppra regular instead of XR option.  She has adjusted to this dosing.  Prefers to use the extended in the future.     Doing well with the Zonegran dose of 200-400mg.     Migraines:  Has had some migraines during the night, as if she has a \"pinched nerve\" on the left shoulder and neck.  The pain onset occurs about one time per month and may last 1-2 days.       Noticing eye strain with working from home.  Trying to wear her glasses as much when on the computer.  Investing in some blue glasses to reduce glare when on the computer.     Memory:  Clarity has improved.  She does not seem as irritable and able to converse clearly.     She was  August 10, 2019. Working full-time and [employer] has now found an improved job.  Beginning to family plan and consider stopping her birth control summer 2021.  Has questions about the progression of her family planning.     Does plan to proactively conceive later in 2021.    9/2018 Brain MRI normal.     6/2018 INTERPRETATION:  This is an abnormal routine EEG recording in the awake, drowsy, and sleep state(s).  Rare bursts of generalized, poorly formed, 3-4 HZ spike and slow wave complexes. Rare independent left and right temporal sharps with intermittent slowing. The findings may suggest: 1)-A primary generalized epilepsy, with fragmented discharges, 2)-Mixed generalized and focal epilepsy (rare), or 3)-Multifocal epilepsy with fast generalization. Consider obtaining a prolonged study if clinically indicated. No typical events were captured during the study. Clinical and radiological correlation is recommended.        Ref. Range 8/10/2020 11:23   25-Hydroxy   Vitamin D 25 Latest Ref Range: 30 - 100 ng/mL 33   Vitamin B12 -True Cobalamin Latest " "Ref Range: 211 - 911 pg/mL 250     Daily MVI  Folic acid 1mg daily    Current Outpatient Medications   Medication Sig Dispense Refill   • zonisamide (ZONEGRAN) 100 MG Cap Take 2 capsules PO qam and 4 capsules PO qhs. 540 Cap 1   • levetiracetam (KEPPRA) 750 MG tablet TAKE 2 TABLETS BY MOUTH EVERY MORNING, AND 3 TABLETS AT BEDTIME 450 Tab 1   • zolmitriptan (ZOMIG-ZMT) 5 MG disintegrating tablet Take 1/2-1 tablet po at onset of headache, may repeat dose in 2 hours if unrelieved.  Do not exceed more than 2 tablets in 24 hours. 9 Tab 2   • AMETHIA LO 0.1-0.02 & 0.01 MG Tab TK 1 T PO D  3   • ibuprofen (MOTRIN) 800 MG Tab TK 1 T PO Q 8 H PRF ORTHOPEDIC PAIN OR HEADACHE  4   • vitamin D (CHOLECALCIFEROL) 1000 UNIT Tab Take 1,000 Units by mouth every day.     • Cyanocobalamin (B-12 PO) Take  by mouth.       No current facility-administered medications for this visit.       Review of Systems   Constitutional: Malaise/fatigue: weight gain.   HENT: Negative for hearing loss, nosebleeds and sore throat.         No recent head injury.   Eyes: Negative for double vision.        No new loss of vision.   Respiratory: Negative for cough.         No recent lung infections.   Cardiovascular: Negative for chest pain.   Gastrointestinal: Negative for abdominal pain, diarrhea, nausea and vomiting.   Genitourinary: Negative.    Musculoskeletal: Negative.    Skin: Negative.    Neurological: Positive for headaches. Negative for seizures.   Endo/Heme/Allergies:        No history of endocrine dysfunction.  No new problems.   Psychiatric/Behavioral: Negative for depression. The patient is not nervous/anxious.         No recent mood changes.          Objective:     /72 (BP Location: Left arm, Patient Position: Sitting, BP Cuff Size: Adult)   Pulse 75   Temp 36 °C (96.8 °F) (Temporal)   Resp 16   Ht 1.558 m (5' 1.34\")   Wt 98.6 kg (217 lb 6 oz)   SpO2 96%   BMI 40.62 kg/m²      Physical Exam  Constitutional:       Appearance: She " is well-developed.   HENT:      Head: Normocephalic and atraumatic.      Mouth/Throat:      Mouth: Mucous membranes are moist.   Eyes:      Pupils: Pupils are equal, round, and reactive to light.   Cardiovascular:      Rate and Rhythm: Normal rate and regular rhythm.   Pulmonary:      Effort: Pulmonary effort is normal.   Musculoskeletal:         General: Normal range of motion.      Cervical back: Normal range of motion.   Skin:     General: Skin is warm.   Neurological:      Mental Status: She is alert and oriented to person, place, and time.      Motor: No abnormal muscle tone.      Gait: Gait normal.      Comments: No observable changes in neurologic status.  See initial new patient examination for details.    Psychiatric:         Mood and Affect: Mood normal.             Assessment/Plan:        Seizure disorder, probable generalized epilepsy versus mixed type:  May 2018 through August 3rd she had at least 4 episodes concerning for seizures.     8/3/2018 Nocturnal seizure, bit tongue, found face down in her pillow.     8/28/2018: longer amnestic period and recovery time with seizure occurring before bed and before taking night dose of AED.     August 10, 2019 near-syncope episode on wedding day.  No concerns for seizures since this time.     Migraine headaches:  Occurring approximately one time per month in association to cervical radiculopathy.  She will be quite impaired with the migraines when they occure.  No apparent cyclical component.  Continue Zomig ZMT tablets which provides effective relief.     In regards to family planning we will prepare her as follows:    1) Continue Zonisamide with dosing change from 200-400mg to 200mg TID.     2) Continue Leviteracetam with updated Rx for extended release, with dosing to TID from 1500mg-2250mg to 1500mg-750mg- 1500mg after dosing transition has been successful with Zonisamide.    3) Will adjust to medication TID dosing 0630-- 1400-- 2000    4) Continue using  birth control as she chooses.    5) Counseled regarding general health, diet, exercise, and rest.  Counseled at length regarding family planning.  Recommend she begin a PNV daily and folic acid 2mg daily.  To call if/when she becomes pregnant.  Will establish with an OB/GYN.     Obtain labs as ordered.     Follow-up: Return for follow-up in 6 months or sooner if needed/pregnancy.     EDUCATION AND COUNSELING:  -Education was provided to the patient and/or family regarding diagnosis and prognosis. The chronic and unpredictable nature of the condition was discussed. There is increased risk for additional events, which may carry potential for significant injuries and death.    -We reviewed the current antiepileptic regimen. Potential side effects of antiepileptics were discussed at length, including but no limited to: hypersensitivity reactions (rash and others, some of which can be fatal), visual field changes (some of which may be irreversible), glaucoma, diplopia, kidney stones, osteopenia/osteoporosis/bone fractures, hyperthermia/anhydrosis, tremors, ataxia, dizziness, fatigue, increased risk for falls, cardiac arrhythmias/syncope, gastrointestinal (hepatitis, pancreatitis, gastritis, ulcers), gingival hypertrophy, drowsiness, sedation, anxiety/nervousness, increased risk for suicide, increased risk for depression, and psychosis. We reviewed drug-drug interactions and their potential effect on seizure control and medication side effects.    -Patient/family educated on SUDEP (Sudden Death in Epilepsy). Counseling was provided on the importance of strict medication and follow up compliance. The patient understands the risks associated with non-adherence with the medical plan as outlined, including but not limited to an increased risk for breakthrough seizures, which may contribute to injuries, disability, status epilepticus, and even death.    -Counseling was also provided on potential effects of alcohol and other  drugs, which may lower seizure threshold and/or affect the metabolism of antiepileptic drugs. I recommend avoidance of alcohol and illegal drugs.  -Recommend proper hydration, regular exercise, proper sleep hygiene (7-8 hrs of overnight sleep, avoid sleep deprivation).    -I have made the patient aware of mandatory reporting required by the law in the State of [place] regarding episodes of seizures, loss of consciousness, and/or alteration of awareness. The patient and family are responsible for reporting events to the DMV, instructions were provided. The patient verbalized understanding and states she has not been driving. Other seizure precautions were discussed at length, including no diving, no skydiving, no unsupervised swimming, no Jacuzzi or bathing in bathtubs.    -She is ok to return to work but cannot operate any heavy machinery, he verbalized understanding.      Pt agrees with plan.

## 2021-04-12 ASSESSMENT — ENCOUNTER SYMPTOMS
VOMITING: 0
COUGH: 0
DIARRHEA: 0
DOUBLE VISION: 0
SORE THROAT: 0
SEIZURES: 0
NAUSEA: 0
ABDOMINAL PAIN: 0
NERVOUS/ANXIOUS: 0
HEADACHES: 1
MUSCULOSKELETAL NEGATIVE: 1
DEPRESSION: 0

## 2021-06-12 ENCOUNTER — HOSPITAL ENCOUNTER (OUTPATIENT)
Dept: LAB | Facility: MEDICAL CENTER | Age: 26
End: 2021-06-12
Attending: NURSE PRACTITIONER
Payer: COMMERCIAL

## 2021-06-12 DIAGNOSIS — G40.909 SEIZURE DISORDER (HCC): ICD-10-CM

## 2021-06-12 DIAGNOSIS — Z30.09 FAMILY PLANNING COUNSELING: ICD-10-CM

## 2021-06-12 DIAGNOSIS — R79.89 LOW VITAMIN B12 LEVEL: ICD-10-CM

## 2021-06-12 DIAGNOSIS — Z91.89 AT RISK FOR OSTEOPOROSIS: ICD-10-CM

## 2021-06-12 LAB
25(OH)D3 SERPL-MCNC: 34 NG/ML (ref 30–100)
ALBUMIN SERPL BCP-MCNC: 4.2 G/DL (ref 3.2–4.9)
ALBUMIN/GLOB SERPL: 1.2 G/DL
ALP SERPL-CCNC: 91 U/L (ref 30–99)
ALT SERPL-CCNC: 32 U/L (ref 2–50)
ANION GAP SERPL CALC-SCNC: 12 MMOL/L (ref 7–16)
AST SERPL-CCNC: 19 U/L (ref 12–45)
BASOPHILS # BLD AUTO: 0.4 % (ref 0–1.8)
BASOPHILS # BLD: 0.03 K/UL (ref 0–0.12)
BILIRUB SERPL-MCNC: 0.3 MG/DL (ref 0.1–1.5)
BUN SERPL-MCNC: 10 MG/DL (ref 8–22)
CALCIUM SERPL-MCNC: 9 MG/DL (ref 8.5–10.5)
CHLORIDE SERPL-SCNC: 109 MMOL/L (ref 96–112)
CO2 SERPL-SCNC: 18 MMOL/L (ref 20–33)
CREAT SERPL-MCNC: 0.71 MG/DL (ref 0.5–1.4)
EOSINOPHIL # BLD AUTO: 0.02 K/UL (ref 0–0.51)
EOSINOPHIL NFR BLD: 0.3 % (ref 0–6.9)
ERYTHROCYTE [DISTWIDTH] IN BLOOD BY AUTOMATED COUNT: 40.6 FL (ref 35.9–50)
GLOBULIN SER CALC-MCNC: 3.6 G/DL (ref 1.9–3.5)
GLUCOSE SERPL-MCNC: 86 MG/DL (ref 65–99)
HCT VFR BLD AUTO: 41.9 % (ref 37–47)
HGB BLD-MCNC: 13.8 G/DL (ref 12–16)
IMM GRANULOCYTES # BLD AUTO: 0.02 K/UL (ref 0–0.11)
IMM GRANULOCYTES NFR BLD AUTO: 0.3 % (ref 0–0.9)
LYMPHOCYTES # BLD AUTO: 2.5 K/UL (ref 1–4.8)
LYMPHOCYTES NFR BLD: 36.5 % (ref 22–41)
MCH RBC QN AUTO: 31.7 PG (ref 27–33)
MCHC RBC AUTO-ENTMCNC: 32.9 G/DL (ref 33.6–35)
MCV RBC AUTO: 96.3 FL (ref 81.4–97.8)
MONOCYTES # BLD AUTO: 0.58 K/UL (ref 0–0.85)
MONOCYTES NFR BLD AUTO: 8.5 % (ref 0–13.4)
NEUTROPHILS # BLD AUTO: 3.69 K/UL (ref 2–7.15)
NEUTROPHILS NFR BLD: 54 % (ref 44–72)
NRBC # BLD AUTO: 0 K/UL
NRBC BLD-RTO: 0 /100 WBC
PLATELET # BLD AUTO: 150 K/UL (ref 164–446)
PMV BLD AUTO: 12.2 FL (ref 9–12.9)
POTASSIUM SERPL-SCNC: 3.8 MMOL/L (ref 3.6–5.5)
PROT SERPL-MCNC: 7.8 G/DL (ref 6–8.2)
RBC # BLD AUTO: 4.35 M/UL (ref 4.2–5.4)
SODIUM SERPL-SCNC: 139 MMOL/L (ref 135–145)
VIT B12 SERPL-MCNC: 399 PG/ML (ref 211–911)
WBC # BLD AUTO: 6.8 K/UL (ref 4.8–10.8)

## 2021-06-12 PROCEDURE — 80203 DRUG SCREEN QUANT ZONISAMIDE: CPT

## 2021-06-12 PROCEDURE — 85025 COMPLETE CBC W/AUTO DIFF WBC: CPT

## 2021-06-12 PROCEDURE — 82607 VITAMIN B-12: CPT

## 2021-06-12 PROCEDURE — 82306 VITAMIN D 25 HYDROXY: CPT

## 2021-06-12 PROCEDURE — 80177 DRUG SCRN QUAN LEVETIRACETAM: CPT

## 2021-06-12 PROCEDURE — 80053 COMPREHEN METABOLIC PANEL: CPT

## 2021-06-12 PROCEDURE — 36415 COLL VENOUS BLD VENIPUNCTURE: CPT

## 2021-06-14 ENCOUNTER — TELEPHONE (OUTPATIENT)
Dept: NEUROLOGY | Facility: MEDICAL CENTER | Age: 26
End: 2021-06-14

## 2021-06-14 LAB
LEVETIRACETAM SERPL-MCNC: 34 UG/ML (ref 12–46)
ZONISAMIDE SERPL-MCNC: 45 UG/ML (ref 10–40)

## 2021-06-14 NOTE — TELEPHONE ENCOUNTER
Please let Laura know I have reviewed her labs and all are within normal range.    As she probably has heard, I am departing [facility].  Through the [place] I'd recommend she f/u with Dr Plummer or MARYSOL Strickland.    Other options include:  Dr Carpio, [facility]  Dr Roberts, [facility]  Dr Lyn, [facility]    She can google search after my departure on June 23rd to find my practice location.

## 2021-06-15 ENCOUNTER — TELEPHONE (OUTPATIENT)
Dept: NEUROLOGY | Facility: MEDICAL CENTER | Age: 26
End: 2021-06-15

## 2021-06-15 NOTE — TELEPHONE ENCOUNTER
Please ask Laura when her last medication dose was prior to her recent lab draw at 10:40am ?    I'd recommend she take Vitamin D3 2000IU daily and Vitamin B12 1000mcg each morning ( I believe she is already doing something similar).         Ref. Range 6/12/2021 10:40   Keppra Latest Ref Range: 12 - 46 ug/mL 34   Zonisamide -Zonegran Latest Ref Range: 10 - 40 ug/mL 45 (H)   25-Hydroxy   Vitamin D 25 Latest Ref Range: 30 - 100 ng/mL 34   Vitamin B12 -True Cobalamin Latest Ref Range: 211 - 911 pg/mL 399

## 2021-08-01 DIAGNOSIS — G40.909 SEIZURE DISORDER (HCC): ICD-10-CM

## 2021-08-02 RX ORDER — LEVETIRACETAM 750 MG/1
TABLET ORAL
Qty: 450 TABLET | Refills: 0 | Status: SHIPPED | OUTPATIENT
Start: 2021-08-02

## 2021-08-02 NOTE — TELEPHONE ENCOUNTER
Received request via: Pharmacy    Was the patient seen in the last year in this department? Yes    Does the patient have an active prescription (recently filled or refills available) for medication(s) requested? No     Last seen by Celsa Mlaik 04/2021   Next appt not scheduled. Pt notified that appt is need for further refills.

## 2021-08-18 ENCOUNTER — HOSPITAL ENCOUNTER (OUTPATIENT)
Dept: LAB | Facility: MEDICAL CENTER | Age: 26
End: 2021-08-18
Attending: OBSTETRICS & GYNECOLOGY
Payer: COMMERCIAL

## 2021-08-18 PROCEDURE — 87591 N.GONORRHOEAE DNA AMP PROB: CPT

## 2021-08-18 PROCEDURE — 87624 HPV HI-RISK TYP POOLED RSLT: CPT

## 2021-08-18 PROCEDURE — 87491 CHLMYD TRACH DNA AMP PROBE: CPT

## 2021-08-18 PROCEDURE — 88175 CYTOPATH C/V AUTO FLUID REDO: CPT

## 2021-08-20 LAB
C TRACH DNA GENITAL QL NAA+PROBE: NEGATIVE
CYTOLOGY REG CYTOL: NORMAL
N GONORRHOEA DNA GENITAL QL NAA+PROBE: NEGATIVE
SPECIMEN SOURCE: NORMAL

## 2021-08-22 LAB
HPV HR 12 DNA CVX QL NAA+PROBE: NEGATIVE
HPV16 DNA SPEC QL NAA+PROBE: NEGATIVE
HPV18 DNA SPEC QL NAA+PROBE: NEGATIVE
SPECIMEN SOURCE: NORMAL

## 2021-09-11 ENCOUNTER — HOSPITAL ENCOUNTER (OUTPATIENT)
Dept: LAB | Facility: MEDICAL CENTER | Age: 26
End: 2021-09-11
Attending: OBSTETRICS & GYNECOLOGY
Payer: COMMERCIAL

## 2021-09-11 LAB
ABO GROUP BLD: NORMAL
BASOPHILS # BLD AUTO: 0.3 % (ref 0–1.8)
BASOPHILS # BLD: 0.03 K/UL (ref 0–0.12)
BLD GP AB SCN SERPL QL: NORMAL
EOSINOPHIL # BLD AUTO: 0.02 K/UL (ref 0–0.51)
EOSINOPHIL NFR BLD: 0.2 % (ref 0–6.9)
ERYTHROCYTE [DISTWIDTH] IN BLOOD BY AUTOMATED COUNT: 41.4 FL (ref 35.9–50)
HBV SURFACE AG SER QL: NORMAL
HCT VFR BLD AUTO: 39 % (ref 37–47)
HCV AB SER QL: NORMAL
HGB BLD-MCNC: 13.6 G/DL (ref 12–16)
HIV 1+2 AB+HIV1 P24 AG SERPL QL IA: NORMAL
IMM GRANULOCYTES # BLD AUTO: 0.03 K/UL (ref 0–0.11)
IMM GRANULOCYTES NFR BLD AUTO: 0.3 % (ref 0–0.9)
LYMPHOCYTES # BLD AUTO: 2.66 K/UL (ref 1–4.8)
LYMPHOCYTES NFR BLD: 25.5 % (ref 22–41)
MCH RBC QN AUTO: 32.9 PG (ref 27–33)
MCHC RBC AUTO-ENTMCNC: 34.9 G/DL (ref 33.6–35)
MCV RBC AUTO: 94.2 FL (ref 81.4–97.8)
MONOCYTES # BLD AUTO: 0.82 K/UL (ref 0–0.85)
MONOCYTES NFR BLD AUTO: 7.9 % (ref 0–13.4)
NEUTROPHILS # BLD AUTO: 6.86 K/UL (ref 2–7.15)
NEUTROPHILS NFR BLD: 65.8 % (ref 44–72)
NRBC # BLD AUTO: 0 K/UL
NRBC BLD-RTO: 0 /100 WBC
PLATELET # BLD AUTO: 155 K/UL (ref 164–446)
PMV BLD AUTO: 11.7 FL (ref 9–12.9)
RBC # BLD AUTO: 4.14 M/UL (ref 4.2–5.4)
RH BLD: NORMAL
RUBV AB SER QL: 48.7 IU/ML
TREPONEMA PALLIDUM IGG+IGM AB [PRESENCE] IN SERUM OR PLASMA BY IMMUNOASSAY: NORMAL
WBC # BLD AUTO: 10.4 K/UL (ref 4.8–10.8)

## 2021-09-11 PROCEDURE — 86901 BLOOD TYPING SEROLOGIC RH(D): CPT

## 2021-09-11 PROCEDURE — 86803 HEPATITIS C AB TEST: CPT

## 2021-09-11 PROCEDURE — 87340 HEPATITIS B SURFACE AG IA: CPT

## 2021-09-11 PROCEDURE — 86762 RUBELLA ANTIBODY: CPT

## 2021-09-11 PROCEDURE — 87086 URINE CULTURE/COLONY COUNT: CPT

## 2021-09-11 PROCEDURE — 87389 HIV-1 AG W/HIV-1&-2 AB AG IA: CPT

## 2021-09-11 PROCEDURE — 36415 COLL VENOUS BLD VENIPUNCTURE: CPT

## 2021-09-11 PROCEDURE — 85025 COMPLETE CBC W/AUTO DIFF WBC: CPT

## 2021-09-11 PROCEDURE — 86900 BLOOD TYPING SEROLOGIC ABO: CPT

## 2021-09-11 PROCEDURE — 86780 TREPONEMA PALLIDUM: CPT

## 2021-09-11 PROCEDURE — 86850 RBC ANTIBODY SCREEN: CPT

## 2021-09-13 LAB
BACTERIA UR CULT: NORMAL
SIGNIFICANT IND 70042: NORMAL
SITE SITE: NORMAL
SOURCE SOURCE: NORMAL

## 2021-11-11 ENCOUNTER — HOSPITAL ENCOUNTER (OUTPATIENT)
Dept: LAB | Facility: MEDICAL CENTER | Age: 26
End: 2021-11-11
Attending: INTERNAL MEDICINE
Payer: COMMERCIAL

## 2021-11-11 LAB
ALBUMIN SERPL BCP-MCNC: 3.8 G/DL (ref 3.2–4.9)
ALBUMIN/GLOB SERPL: 1.1 G/DL
ALP SERPL-CCNC: 86 U/L (ref 30–99)
ALT SERPL-CCNC: 20 U/L (ref 2–50)
ANION GAP SERPL CALC-SCNC: 12 MMOL/L (ref 7–16)
AST SERPL-CCNC: 17 U/L (ref 12–45)
BILIRUB SERPL-MCNC: 0.2 MG/DL (ref 0.1–1.5)
BUN SERPL-MCNC: 7 MG/DL (ref 8–22)
CALCIUM SERPL-MCNC: 8.5 MG/DL (ref 8.5–10.5)
CHLORIDE SERPL-SCNC: 108 MMOL/L (ref 96–112)
CHOLEST SERPL-MCNC: 223 MG/DL (ref 100–199)
CO2 SERPL-SCNC: 18 MMOL/L (ref 20–33)
CREAT SERPL-MCNC: 0.51 MG/DL (ref 0.5–1.4)
EST. AVERAGE GLUCOSE BLD GHB EST-MCNC: 94 MG/DL
FASTING STATUS PATIENT QL REPORTED: NORMAL
GLOBULIN SER CALC-MCNC: 3.4 G/DL (ref 1.9–3.5)
GLUCOSE SERPL-MCNC: 88 MG/DL (ref 65–99)
HBA1C MFR BLD: 4.9 % (ref 4–5.6)
HDLC SERPL-MCNC: 71 MG/DL
LDLC SERPL CALC-MCNC: 128 MG/DL
POTASSIUM SERPL-SCNC: 4.1 MMOL/L (ref 3.6–5.5)
PROT SERPL-MCNC: 7.2 G/DL (ref 6–8.2)
SODIUM SERPL-SCNC: 138 MMOL/L (ref 135–145)
T4 FREE SERPL-MCNC: 0.92 NG/DL (ref 0.93–1.7)
TRIGL SERPL-MCNC: 118 MG/DL (ref 0–149)
TSH SERPL DL<=0.005 MIU/L-ACNC: 1.92 UIU/ML (ref 0.38–5.33)
VIT B12 SERPL-MCNC: 293 PG/ML (ref 211–911)

## 2021-11-11 PROCEDURE — 82306 VITAMIN D 25 HYDROXY: CPT

## 2021-11-11 PROCEDURE — 83036 HEMOGLOBIN GLYCOSYLATED A1C: CPT

## 2021-11-11 PROCEDURE — 36415 COLL VENOUS BLD VENIPUNCTURE: CPT

## 2021-11-11 PROCEDURE — 80053 COMPREHEN METABOLIC PANEL: CPT

## 2021-11-11 PROCEDURE — 82607 VITAMIN B-12: CPT

## 2021-11-11 PROCEDURE — 80061 LIPID PANEL: CPT

## 2021-11-11 PROCEDURE — 84443 ASSAY THYROID STIM HORMONE: CPT

## 2021-11-11 PROCEDURE — 84439 ASSAY OF FREE THYROXINE: CPT

## 2021-11-13 ENCOUNTER — HOSPITAL ENCOUNTER (OUTPATIENT)
Dept: LAB | Facility: MEDICAL CENTER | Age: 26
End: 2021-11-13
Attending: NURSE PRACTITIONER
Payer: COMMERCIAL

## 2021-11-13 ENCOUNTER — HOSPITAL ENCOUNTER (OUTPATIENT)
Dept: LAB | Facility: MEDICAL CENTER | Age: 26
End: 2021-11-13
Attending: OBSTETRICS & GYNECOLOGY
Payer: COMMERCIAL

## 2021-11-13 LAB
GLUCOSE 1H P 50 G GLC PO SERPL-MCNC: 158 MG/DL (ref 70–139)
HCT VFR BLD AUTO: 38.2 % (ref 37–47)
HGB BLD-MCNC: 13 G/DL (ref 12–16)
PLATELET # BLD AUTO: 162 K/UL (ref 164–446)
TREPONEMA PALLIDUM IGG+IGM AB [PRESENCE] IN SERUM OR PLASMA BY IMMUNOASSAY: NORMAL

## 2021-11-13 PROCEDURE — 85014 HEMATOCRIT: CPT

## 2021-11-13 PROCEDURE — 85018 HEMOGLOBIN: CPT

## 2021-11-13 PROCEDURE — 80177 DRUG SCRN QUAN LEVETIRACETAM: CPT

## 2021-11-13 PROCEDURE — 82950 GLUCOSE TEST: CPT

## 2021-11-13 PROCEDURE — 36415 COLL VENOUS BLD VENIPUNCTURE: CPT

## 2021-11-13 PROCEDURE — 86780 TREPONEMA PALLIDUM: CPT

## 2021-11-13 PROCEDURE — 85049 AUTOMATED PLATELET COUNT: CPT

## 2021-11-13 PROCEDURE — 80203 DRUG SCREEN QUANT ZONISAMIDE: CPT

## 2021-11-14 LAB — 25(OH)D3 SERPL-MCNC: 25 NG/ML (ref 30–80)

## 2021-11-16 LAB
LEVETIRACETAM SERPL-MCNC: 22 UG/ML (ref 12–46)
ZONISAMIDE SERPL-MCNC: 36 UG/ML (ref 10–40)

## 2021-11-29 DIAGNOSIS — G40.909 SEIZURE DISORDER (HCC): ICD-10-CM

## 2021-11-29 RX ORDER — ZONISAMIDE 100 MG/1
CAPSULE ORAL
Qty: 180 CAPSULE | Refills: 0 | Status: SHIPPED | OUTPATIENT
Start: 2021-11-29 | End: 2022-06-22

## 2021-12-04 ENCOUNTER — HOSPITAL ENCOUNTER (OUTPATIENT)
Dept: LAB | Facility: MEDICAL CENTER | Age: 26
End: 2021-12-04
Attending: OBSTETRICS & GYNECOLOGY
Payer: COMMERCIAL

## 2021-12-04 LAB
GLUCOSE 1H P CHAL SERPL-MCNC: 139 MG/DL (ref 65–180)
GLUCOSE 2H P CHAL SERPL-MCNC: 91 MG/DL (ref 65–155)
GLUCOSE 3H P CHAL SERPL-MCNC: 127 MG/DL (ref 65–140)
GLUCOSE BS SERPL-MCNC: 82 MG/DL (ref 65–95)

## 2021-12-04 PROCEDURE — 36415 COLL VENOUS BLD VENIPUNCTURE: CPT

## 2021-12-04 PROCEDURE — 82951 GLUCOSE TOLERANCE TEST (GTT): CPT

## 2021-12-04 PROCEDURE — 82952 GTT-ADDED SAMPLES: CPT

## 2021-12-14 ENCOUNTER — HOSPITAL ENCOUNTER (OUTPATIENT)
Dept: LAB | Facility: MEDICAL CENTER | Age: 26
End: 2021-12-14
Attending: OBSTETRICS & GYNECOLOGY
Payer: COMMERCIAL

## 2021-12-14 PROCEDURE — 88175 CYTOPATH C/V AUTO FLUID REDO: CPT

## 2021-12-14 PROCEDURE — 87624 HPV HI-RISK TYP POOLED RSLT: CPT

## 2021-12-15 LAB
CYTOLOGY REG CYTOL: NORMAL
HPV HR 12 DNA CVX QL NAA+PROBE: NEGATIVE
HPV16 DNA SPEC QL NAA+PROBE: NEGATIVE
HPV18 DNA SPEC QL NAA+PROBE: NEGATIVE
SPECIMEN SOURCE: NORMAL

## 2021-12-18 ENCOUNTER — HOSPITAL ENCOUNTER (OUTPATIENT)
Dept: LAB | Facility: MEDICAL CENTER | Age: 26
End: 2021-12-18
Attending: NURSE PRACTITIONER
Payer: COMMERCIAL

## 2021-12-18 PROCEDURE — 80203 DRUG SCREEN QUANT ZONISAMIDE: CPT

## 2021-12-18 PROCEDURE — 80177 DRUG SCRN QUAN LEVETIRACETAM: CPT

## 2021-12-18 PROCEDURE — 36415 COLL VENOUS BLD VENIPUNCTURE: CPT

## 2021-12-20 LAB
LEVETIRACETAM SERPL-MCNC: 26 UG/ML (ref 12–46)
ZONISAMIDE SERPL-MCNC: 33 UG/ML (ref 10–40)

## 2022-01-19 ENCOUNTER — HOSPITAL ENCOUNTER (OUTPATIENT)
Dept: LAB | Facility: MEDICAL CENTER | Age: 27
End: 2022-01-19
Attending: OBSTETRICS & GYNECOLOGY
Payer: COMMERCIAL

## 2022-01-19 PROCEDURE — 87081 CULTURE SCREEN ONLY: CPT

## 2022-01-19 PROCEDURE — 87150 DNA/RNA AMPLIFIED PROBE: CPT

## 2022-01-21 LAB — GP B STREP DNA SPEC QL NAA+PROBE: NEGATIVE

## 2022-03-19 ENCOUNTER — HOSPITAL ENCOUNTER (OUTPATIENT)
Dept: LAB | Facility: MEDICAL CENTER | Age: 27
End: 2022-03-19
Attending: NURSE PRACTITIONER
Payer: COMMERCIAL

## 2022-03-19 ENCOUNTER — HOSPITAL ENCOUNTER (OUTPATIENT)
Dept: LAB | Facility: MEDICAL CENTER | Age: 27
End: 2022-03-19
Attending: INTERNAL MEDICINE
Payer: COMMERCIAL

## 2022-03-19 LAB
25(OH)D3 SERPL-MCNC: 35 NG/ML (ref 30–100)
EST. AVERAGE GLUCOSE BLD GHB EST-MCNC: 97 MG/DL
HBA1C MFR BLD: 5 % (ref 4–5.6)
T4 FREE SERPL-MCNC: 0.98 NG/DL (ref 0.93–1.7)
TSH SERPL DL<=0.005 MIU/L-ACNC: 2.68 UIU/ML (ref 0.38–5.33)

## 2022-03-19 PROCEDURE — 80203 DRUG SCREEN QUANT ZONISAMIDE: CPT

## 2022-03-19 PROCEDURE — 83036 HEMOGLOBIN GLYCOSYLATED A1C: CPT

## 2022-03-19 PROCEDURE — 80177 DRUG SCRN QUAN LEVETIRACETAM: CPT

## 2022-03-19 PROCEDURE — 84443 ASSAY THYROID STIM HORMONE: CPT

## 2022-03-19 PROCEDURE — 82306 VITAMIN D 25 HYDROXY: CPT

## 2022-03-19 PROCEDURE — 36415 COLL VENOUS BLD VENIPUNCTURE: CPT

## 2022-03-19 PROCEDURE — 84439 ASSAY OF FREE THYROXINE: CPT

## 2022-03-21 LAB — LEVETIRACETAM SERPL-MCNC: 47 UG/ML (ref 10–40)

## 2022-03-22 LAB — ZONISAMIDE SERPL-MCNC: 42 UG/ML (ref 10–40)

## 2022-03-25 ENCOUNTER — HOSPITAL ENCOUNTER (OUTPATIENT)
Dept: LAB | Facility: MEDICAL CENTER | Age: 27
End: 2022-03-25
Attending: OBSTETRICS & GYNECOLOGY
Payer: COMMERCIAL

## 2022-03-25 PROCEDURE — 88175 CYTOPATH C/V AUTO FLUID REDO: CPT

## 2022-03-25 PROCEDURE — 87624 HPV HI-RISK TYP POOLED RSLT: CPT

## 2022-03-25 PROCEDURE — 88305 TISSUE EXAM BY PATHOLOGIST: CPT

## 2022-03-31 LAB
PATHOLOGY CONSULT NOTE: NORMAL
PATHOLOGY CONSULT NOTE: NORMAL

## 2022-05-17 ENCOUNTER — HOSPITAL ENCOUNTER (OUTPATIENT)
Facility: MEDICAL CENTER | Age: 27
End: 2022-05-17
Attending: OBSTETRICS & GYNECOLOGY
Payer: COMMERCIAL

## 2022-05-17 LAB — PATHOLOGY CONSULT NOTE: NORMAL

## 2022-05-17 PROCEDURE — 88307 TISSUE EXAM BY PATHOLOGIST: CPT | Mod: 59

## 2022-05-17 PROCEDURE — 88305 TISSUE EXAM BY PATHOLOGIST: CPT

## 2022-06-20 DIAGNOSIS — G40.909 SEIZURE DISORDER (HCC): ICD-10-CM

## 2022-06-22 RX ORDER — ZONISAMIDE 100 MG/1
CAPSULE ORAL
Qty: 180 CAPSULE | Refills: 0 | Status: SHIPPED | OUTPATIENT
Start: 2022-06-22 | End: 2022-07-21

## 2022-09-30 ENCOUNTER — HOSPITAL ENCOUNTER (OUTPATIENT)
Dept: LAB | Facility: MEDICAL CENTER | Age: 27
End: 2022-09-30
Attending: OBSTETRICS & GYNECOLOGY
Payer: COMMERCIAL

## 2022-09-30 PROCEDURE — 88175 CYTOPATH C/V AUTO FLUID REDO: CPT

## 2022-09-30 PROCEDURE — 87624 HPV HI-RISK TYP POOLED RSLT: CPT

## 2023-01-27 ENCOUNTER — HOSPITAL ENCOUNTER (OUTPATIENT)
Dept: LAB | Facility: MEDICAL CENTER | Age: 28
End: 2023-01-27
Attending: OBSTETRICS & GYNECOLOGY

## 2023-01-27 ENCOUNTER — HOSPITAL ENCOUNTER (OUTPATIENT)
Facility: MEDICAL CENTER | Age: 28
End: 2023-01-27
Attending: OBSTETRICS & GYNECOLOGY
Payer: COMMERCIAL

## 2023-01-27 PROCEDURE — 87624 HPV HI-RISK TYP POOLED RSLT: CPT

## 2023-01-27 PROCEDURE — 88175 CYTOPATH C/V AUTO FLUID REDO: CPT

## 2024-01-25 ENCOUNTER — HOSPITAL ENCOUNTER (OUTPATIENT)
Dept: LAB | Facility: MEDICAL CENTER | Age: 29
End: 2024-01-25
Attending: INTERNAL MEDICINE
Payer: COMMERCIAL

## 2024-01-25 LAB
25(OH)D3 SERPL-MCNC: 36 NG/ML (ref 30–100)
ALBUMIN SERPL BCP-MCNC: 4.2 G/DL (ref 3.2–4.9)
ALBUMIN/GLOB SERPL: 1.1 G/DL
ALP SERPL-CCNC: 75 U/L (ref 30–99)
ALT SERPL-CCNC: 13 U/L (ref 2–50)
ANION GAP SERPL CALC-SCNC: 15 MMOL/L (ref 7–16)
AST SERPL-CCNC: 19 U/L (ref 12–45)
BASOPHILS # BLD AUTO: 0.3 % (ref 0–1.8)
BASOPHILS # BLD: 0.02 K/UL (ref 0–0.12)
BILIRUB SERPL-MCNC: 0.2 MG/DL (ref 0.1–1.5)
BUN SERPL-MCNC: 12 MG/DL (ref 8–22)
CALCIUM ALBUM COR SERPL-MCNC: 8.6 MG/DL (ref 8.5–10.5)
CALCIUM SERPL-MCNC: 8.8 MG/DL (ref 8.5–10.5)
CHLORIDE SERPL-SCNC: 105 MMOL/L (ref 96–112)
CHOLEST SERPL-MCNC: 190 MG/DL (ref 100–199)
CO2 SERPL-SCNC: 21 MMOL/L (ref 20–33)
CREAT SERPL-MCNC: 0.7 MG/DL (ref 0.5–1.4)
EOSINOPHIL # BLD AUTO: 0.05 K/UL (ref 0–0.51)
EOSINOPHIL NFR BLD: 0.7 % (ref 0–6.9)
ERYTHROCYTE [DISTWIDTH] IN BLOOD BY AUTOMATED COUNT: 39.4 FL (ref 35.9–50)
EST. AVERAGE GLUCOSE BLD GHB EST-MCNC: 114 MG/DL
GFR SERPLBLD CREATININE-BSD FMLA CKD-EPI: 119 ML/MIN/1.73 M 2
GLOBULIN SER CALC-MCNC: 3.9 G/DL (ref 1.9–3.5)
GLUCOSE SERPL-MCNC: 91 MG/DL (ref 65–99)
HBA1C MFR BLD: 5.6 % (ref 4–5.6)
HCT VFR BLD AUTO: 44.5 % (ref 37–47)
HDLC SERPL-MCNC: 41 MG/DL
HGB BLD-MCNC: 14.9 G/DL (ref 12–16)
IMM GRANULOCYTES # BLD AUTO: 0.01 K/UL (ref 0–0.11)
IMM GRANULOCYTES NFR BLD AUTO: 0.1 % (ref 0–0.9)
LDLC SERPL CALC-MCNC: 134 MG/DL
LYMPHOCYTES # BLD AUTO: 2.75 K/UL (ref 1–4.8)
LYMPHOCYTES NFR BLD: 37.9 % (ref 22–41)
MCH RBC QN AUTO: 29.4 PG (ref 27–33)
MCHC RBC AUTO-ENTMCNC: 33.5 G/DL (ref 32.2–35.5)
MCV RBC AUTO: 87.9 FL (ref 81.4–97.8)
MONOCYTES # BLD AUTO: 0.57 K/UL (ref 0–0.85)
MONOCYTES NFR BLD AUTO: 7.9 % (ref 0–13.4)
NEUTROPHILS # BLD AUTO: 3.86 K/UL (ref 1.82–7.42)
NEUTROPHILS NFR BLD: 53.1 % (ref 44–72)
NRBC # BLD AUTO: 0 K/UL
NRBC BLD-RTO: 0 /100 WBC (ref 0–0.2)
PLATELET # BLD AUTO: 170 K/UL (ref 164–446)
PMV BLD AUTO: 11.3 FL (ref 9–12.9)
POTASSIUM SERPL-SCNC: 4.4 MMOL/L (ref 3.6–5.5)
PROT SERPL-MCNC: 8.1 G/DL (ref 6–8.2)
RBC # BLD AUTO: 5.06 M/UL (ref 4.2–5.4)
SODIUM SERPL-SCNC: 141 MMOL/L (ref 135–145)
T4 FREE SERPL-MCNC: 1.18 NG/DL (ref 0.93–1.7)
TRIGL SERPL-MCNC: 74 MG/DL (ref 0–149)
TSH SERPL DL<=0.005 MIU/L-ACNC: 1.77 UIU/ML (ref 0.38–5.33)
VIT B12 SERPL-MCNC: 335 PG/ML (ref 211–911)
WBC # BLD AUTO: 7.3 K/UL (ref 4.8–10.8)

## 2024-01-25 PROCEDURE — 82306 VITAMIN D 25 HYDROXY: CPT

## 2024-01-25 PROCEDURE — 84443 ASSAY THYROID STIM HORMONE: CPT

## 2024-01-25 PROCEDURE — 85025 COMPLETE CBC W/AUTO DIFF WBC: CPT

## 2024-01-25 PROCEDURE — 83036 HEMOGLOBIN GLYCOSYLATED A1C: CPT

## 2024-01-25 PROCEDURE — 84439 ASSAY OF FREE THYROXINE: CPT

## 2024-01-25 PROCEDURE — 82607 VITAMIN B-12: CPT

## 2024-01-25 PROCEDURE — 80061 LIPID PANEL: CPT

## 2024-01-25 PROCEDURE — 80053 COMPREHEN METABOLIC PANEL: CPT

## 2024-01-25 PROCEDURE — 36415 COLL VENOUS BLD VENIPUNCTURE: CPT

## 2024-01-29 ENCOUNTER — HOSPITAL ENCOUNTER (OUTPATIENT)
Dept: LAB | Facility: MEDICAL CENTER | Age: 29
End: 2024-01-29
Attending: OBSTETRICS & GYNECOLOGY
Payer: COMMERCIAL

## 2024-01-29 PROCEDURE — 87624 HPV HI-RISK TYP POOLED RSLT: CPT

## 2024-01-29 PROCEDURE — 88175 CYTOPATH C/V AUTO FLUID REDO: CPT

## 2024-02-01 LAB
CYTOLOGIST CVX/VAG CYTO: NORMAL
CYTOLOGY CVX/VAG DOC CYTO: NORMAL
CYTOLOGY CVX/VAG DOC THIN PREP: NORMAL
HPV I/H RISK 4 DNA CVX QL PROBE+SIG AMP: NEGATIVE
NOTE NL11727A: NORMAL
OTHER STN SPEC: NORMAL
STAT OF ADQ CVX/VAG CYTO-IMP: NORMAL

## 2024-06-12 ENCOUNTER — HOSPITAL ENCOUNTER (OUTPATIENT)
Facility: MEDICAL CENTER | Age: 29
End: 2024-06-12
Attending: OBSTETRICS & GYNECOLOGY
Payer: COMMERCIAL

## 2024-06-12 LAB
ABO GROUP BLD: NORMAL
BASOPHILS # BLD AUTO: 0.3 % (ref 0–1.8)
BASOPHILS # BLD: 0.03 K/UL (ref 0–0.12)
BLD GP AB SCN SERPL QL: NORMAL
EOSINOPHIL # BLD AUTO: 0.02 K/UL (ref 0–0.51)
EOSINOPHIL NFR BLD: 0.2 % (ref 0–6.9)
ERYTHROCYTE [DISTWIDTH] IN BLOOD BY AUTOMATED COUNT: 40.7 FL (ref 35.9–50)
HBV SURFACE AG SER QL: ABNORMAL
HCT VFR BLD AUTO: 41.3 % (ref 37–47)
HCV AB SER QL: ABNORMAL
HGB BLD-MCNC: 14.1 G/DL (ref 12–16)
HIV 1+2 AB+HIV1 P24 AG SERPL QL IA: NORMAL
IMM GRANULOCYTES # BLD AUTO: 0.03 K/UL (ref 0–0.11)
IMM GRANULOCYTES NFR BLD AUTO: 0.3 % (ref 0–0.9)
LYMPHOCYTES # BLD AUTO: 3.3 K/UL (ref 1–4.8)
LYMPHOCYTES NFR BLD: 32.4 % (ref 22–41)
MCH RBC QN AUTO: 29.4 PG (ref 27–33)
MCHC RBC AUTO-ENTMCNC: 34.1 G/DL (ref 32.2–35.5)
MCV RBC AUTO: 86.2 FL (ref 81.4–97.8)
MONOCYTES # BLD AUTO: 0.9 K/UL (ref 0–0.85)
MONOCYTES NFR BLD AUTO: 8.8 % (ref 0–13.4)
NEUTROPHILS # BLD AUTO: 5.91 K/UL (ref 1.82–7.42)
NEUTROPHILS NFR BLD: 58 % (ref 44–72)
NRBC # BLD AUTO: 0 K/UL
NRBC BLD-RTO: 0 /100 WBC (ref 0–0.2)
PLATELET # BLD AUTO: 175 K/UL (ref 164–446)
PMV BLD AUTO: 10.8 FL (ref 9–12.9)
RBC # BLD AUTO: 4.79 M/UL (ref 4.2–5.4)
RH BLD: NORMAL
RUBV AB SER QL: 45.4 IU/ML
T PALLIDUM AB SER QL IA: ABNORMAL
WBC # BLD AUTO: 10.2 K/UL (ref 4.8–10.8)

## 2024-06-12 PROCEDURE — 87491 CHLMYD TRACH DNA AMP PROBE: CPT

## 2024-06-12 PROCEDURE — 87086 URINE CULTURE/COLONY COUNT: CPT

## 2024-06-12 PROCEDURE — 87340 HEPATITIS B SURFACE AG IA: CPT

## 2024-06-12 PROCEDURE — 87591 N.GONORRHOEAE DNA AMP PROB: CPT

## 2024-06-12 PROCEDURE — 86900 BLOOD TYPING SEROLOGIC ABO: CPT

## 2024-06-12 PROCEDURE — 86762 RUBELLA ANTIBODY: CPT

## 2024-06-12 PROCEDURE — 87389 HIV-1 AG W/HIV-1&-2 AB AG IA: CPT

## 2024-06-12 PROCEDURE — 86850 RBC ANTIBODY SCREEN: CPT

## 2024-06-12 PROCEDURE — 86803 HEPATITIS C AB TEST: CPT

## 2024-06-12 PROCEDURE — 36415 COLL VENOUS BLD VENIPUNCTURE: CPT

## 2024-06-12 PROCEDURE — 85025 COMPLETE CBC W/AUTO DIFF WBC: CPT

## 2024-06-12 PROCEDURE — 86592 SYPHILIS TEST NON-TREP QUAL: CPT

## 2024-06-12 PROCEDURE — 86901 BLOOD TYPING SEROLOGIC RH(D): CPT

## 2024-06-12 PROCEDURE — 86780 TREPONEMA PALLIDUM: CPT

## 2024-06-14 LAB
BACTERIA UR CULT: NORMAL
SIGNIFICANT IND 70042: NORMAL
SITE SITE: NORMAL
SOURCE SOURCE: NORMAL

## 2024-11-02 ENCOUNTER — HOSPITAL ENCOUNTER (OUTPATIENT)
Dept: LAB | Facility: MEDICAL CENTER | Age: 29
End: 2024-11-02
Attending: OBSTETRICS & GYNECOLOGY
Payer: COMMERCIAL

## 2024-11-02 LAB
GLUCOSE 1H P 50 G GLC PO SERPL-MCNC: 152 MG/DL (ref 70–139)
HCT VFR BLD AUTO: 36.7 % (ref 37–47)
HGB BLD-MCNC: 12 G/DL (ref 12–16)
PLATELET # BLD AUTO: 171 K/UL (ref 164–446)
T PALLIDUM AB SER QL IA: NORMAL

## 2024-11-02 PROCEDURE — 82950 GLUCOSE TEST: CPT

## 2024-11-02 PROCEDURE — 85018 HEMOGLOBIN: CPT

## 2024-11-02 PROCEDURE — 85014 HEMATOCRIT: CPT

## 2024-11-02 PROCEDURE — 36415 COLL VENOUS BLD VENIPUNCTURE: CPT

## 2024-11-02 PROCEDURE — 85049 AUTOMATED PLATELET COUNT: CPT

## 2024-11-02 PROCEDURE — 86780 TREPONEMA PALLIDUM: CPT

## 2024-11-16 ENCOUNTER — HOSPITAL ENCOUNTER (OUTPATIENT)
Dept: LAB | Facility: MEDICAL CENTER | Age: 29
End: 2024-11-16
Attending: OBSTETRICS & GYNECOLOGY
Payer: COMMERCIAL

## 2024-11-16 LAB
25(OH)D3 SERPL-MCNC: 45 NG/ML (ref 30–100)
ALBUMIN SERPL BCP-MCNC: 3.5 G/DL (ref 3.2–4.9)
ALBUMIN/GLOB SERPL: 1 G/DL
ALP SERPL-CCNC: 136 U/L (ref 30–99)
ALT SERPL-CCNC: 15 U/L (ref 2–50)
ANION GAP SERPL CALC-SCNC: 10 MMOL/L (ref 7–16)
AST SERPL-CCNC: 18 U/L (ref 12–45)
BASOPHILS # BLD AUTO: 0.4 % (ref 0–1.8)
BASOPHILS # BLD: 0.04 K/UL (ref 0–0.12)
BILIRUB SERPL-MCNC: 0.2 MG/DL (ref 0.1–1.5)
BUN SERPL-MCNC: 8 MG/DL (ref 8–22)
CALCIUM ALBUM COR SERPL-MCNC: 9.6 MG/DL (ref 8.5–10.5)
CALCIUM SERPL-MCNC: 9.2 MG/DL (ref 8.5–10.5)
CHLORIDE SERPL-SCNC: 104 MMOL/L (ref 96–112)
CO2 SERPL-SCNC: 22 MMOL/L (ref 20–33)
CREAT SERPL-MCNC: 0.61 MG/DL (ref 0.5–1.4)
EOSINOPHIL # BLD AUTO: 0.05 K/UL (ref 0–0.51)
EOSINOPHIL NFR BLD: 0.5 % (ref 0–6.9)
ERYTHROCYTE [DISTWIDTH] IN BLOOD BY AUTOMATED COUNT: 39.2 FL (ref 35.9–50)
EST. AVERAGE GLUCOSE BLD GHB EST-MCNC: 123 MG/DL
GFR SERPLBLD CREATININE-BSD FMLA CKD-EPI: 122 ML/MIN/1.73 M 2
GLOBULIN SER CALC-MCNC: 3.5 G/DL (ref 1.9–3.5)
GLUCOSE 1H P CHAL SERPL-MCNC: 126 MG/DL (ref 65–199)
GLUCOSE 2H P CHAL SERPL-MCNC: 122 MG/DL (ref 65–139)
GLUCOSE 3H P CHAL SERPL-MCNC: 110 MG/DL (ref 65–109)
GLUCOSE BS SERPL-MCNC: 90 MG/DL (ref 65–99)
GLUCOSE SERPL-MCNC: 94 MG/DL (ref 65–99)
HBA1C MFR BLD: 5.9 % (ref 4–5.6)
HCT VFR BLD AUTO: 35.3 % (ref 37–47)
HGB BLD-MCNC: 11.8 G/DL (ref 12–16)
IMM GRANULOCYTES # BLD AUTO: 0.07 K/UL (ref 0–0.11)
IMM GRANULOCYTES NFR BLD AUTO: 0.6 % (ref 0–0.9)
LYMPHOCYTES # BLD AUTO: 2.09 K/UL (ref 1–4.8)
LYMPHOCYTES NFR BLD: 19 % (ref 22–41)
MCH RBC QN AUTO: 29.5 PG (ref 27–33)
MCHC RBC AUTO-ENTMCNC: 33.4 G/DL (ref 32.2–35.5)
MCV RBC AUTO: 88.3 FL (ref 81.4–97.8)
MONOCYTES # BLD AUTO: 0.78 K/UL (ref 0–0.85)
MONOCYTES NFR BLD AUTO: 7.1 % (ref 0–13.4)
NEUTROPHILS # BLD AUTO: 7.96 K/UL (ref 1.82–7.42)
NEUTROPHILS NFR BLD: 72.4 % (ref 44–72)
NRBC # BLD AUTO: 0 K/UL
NRBC BLD-RTO: 0 /100 WBC (ref 0–0.2)
PLATELET # BLD AUTO: 151 K/UL (ref 164–446)
PMV BLD AUTO: 10.7 FL (ref 9–12.9)
POTASSIUM SERPL-SCNC: 4.3 MMOL/L (ref 3.6–5.5)
PROT SERPL-MCNC: 7 G/DL (ref 6–8.2)
RBC # BLD AUTO: 4 M/UL (ref 4.2–5.4)
SODIUM SERPL-SCNC: 136 MMOL/L (ref 135–145)
WBC # BLD AUTO: 11 K/UL (ref 4.8–10.8)

## 2024-11-16 PROCEDURE — 82951 GLUCOSE TOLERANCE TEST (GTT): CPT

## 2024-11-16 PROCEDURE — 82952 GTT-ADDED SAMPLES: CPT

## 2024-11-16 PROCEDURE — 85025 COMPLETE CBC W/AUTO DIFF WBC: CPT

## 2024-11-16 PROCEDURE — 83036 HEMOGLOBIN GLYCOSYLATED A1C: CPT

## 2024-11-16 PROCEDURE — 82306 VITAMIN D 25 HYDROXY: CPT

## 2024-11-16 PROCEDURE — 36415 COLL VENOUS BLD VENIPUNCTURE: CPT

## 2024-11-16 PROCEDURE — 80053 COMPREHEN METABOLIC PANEL: CPT

## 2024-12-04 ENCOUNTER — HOSPITAL ENCOUNTER (OUTPATIENT)
Facility: MEDICAL CENTER | Age: 29
End: 2024-12-04
Attending: OBSTETRICS & GYNECOLOGY
Payer: COMMERCIAL

## 2024-12-04 PROCEDURE — 87081 CULTURE SCREEN ONLY: CPT

## 2024-12-04 PROCEDURE — 87150 DNA/RNA AMPLIFIED PROBE: CPT

## 2024-12-06 LAB — GP B STREP DNA SPEC QL NAA+PROBE: NEGATIVE

## 2025-01-07 ENCOUNTER — ANESTHESIA (OUTPATIENT)
Dept: OBGYN | Facility: MEDICAL CENTER | Age: 30
End: 2025-01-07
Payer: COMMERCIAL

## 2025-01-07 ENCOUNTER — HOSPITAL ENCOUNTER (INPATIENT)
Facility: MEDICAL CENTER | Age: 30
LOS: 2 days | End: 2025-01-09
Attending: OBSTETRICS & GYNECOLOGY | Admitting: OBSTETRICS & GYNECOLOGY
Payer: COMMERCIAL

## 2025-01-07 ENCOUNTER — ANESTHESIA EVENT (OUTPATIENT)
Dept: OBGYN | Facility: MEDICAL CENTER | Age: 30
End: 2025-01-07
Payer: COMMERCIAL

## 2025-01-07 DIAGNOSIS — Z09 SURGERY FOLLOW-UP: ICD-10-CM

## 2025-01-07 DIAGNOSIS — D50.9 IRON DEFICIENCY ANEMIA, UNSPECIFIED IRON DEFICIENCY ANEMIA TYPE: ICD-10-CM

## 2025-01-07 LAB
BASOPHILS # BLD AUTO: 0.2 % (ref 0–1.8)
BASOPHILS # BLD: 0.02 K/UL (ref 0–0.12)
CRYSTALS AMN MICRO: NORMAL
EOSINOPHIL # BLD AUTO: 0.06 K/UL (ref 0–0.51)
EOSINOPHIL NFR BLD: 0.5 % (ref 0–6.9)
ERYTHROCYTE [DISTWIDTH] IN BLOOD BY AUTOMATED COUNT: 39.1 FL (ref 35.9–50)
HCT VFR BLD AUTO: 33 % (ref 37–47)
HGB BLD-MCNC: 10.5 G/DL (ref 12–16)
HOLDING TUBE BB 8507: NORMAL
IMM GRANULOCYTES # BLD AUTO: 0.05 K/UL (ref 0–0.11)
IMM GRANULOCYTES NFR BLD AUTO: 0.4 % (ref 0–0.9)
LYMPHOCYTES # BLD AUTO: 1.8 K/UL (ref 1–4.8)
LYMPHOCYTES NFR BLD: 14.7 % (ref 22–41)
MCH RBC QN AUTO: 25.9 PG (ref 27–33)
MCHC RBC AUTO-ENTMCNC: 31.8 G/DL (ref 32.2–35.5)
MCV RBC AUTO: 81.3 FL (ref 81.4–97.8)
MONOCYTES # BLD AUTO: 0.76 K/UL (ref 0–0.85)
MONOCYTES NFR BLD AUTO: 6.2 % (ref 0–13.4)
NEUTROPHILS # BLD AUTO: 9.54 K/UL (ref 1.82–7.42)
NEUTROPHILS NFR BLD: 78 % (ref 44–72)
NRBC # BLD AUTO: 0 K/UL
NRBC BLD-RTO: 0 /100 WBC (ref 0–0.2)
PLATELET # BLD AUTO: 160 K/UL (ref 164–446)
PMV BLD AUTO: 10.3 FL (ref 9–12.9)
RBC # BLD AUTO: 4.06 M/UL (ref 4.2–5.4)
T PALLIDUM AB SER QL IA: NONREACTIVE
WBC # BLD AUTO: 12.2 K/UL (ref 4.8–10.8)

## 2025-01-07 PROCEDURE — 160036 HCHG PACU - EA ADDL 30 MINS PHASE I: Performed by: OBSTETRICS & GYNECOLOGY

## 2025-01-07 PROCEDURE — A9270 NON-COVERED ITEM OR SERVICE: HCPCS | Performed by: OBSTETRICS & GYNECOLOGY

## 2025-01-07 PROCEDURE — 700111 HCHG RX REV CODE 636 W/ 250 OVERRIDE (IP): Mod: JZ | Performed by: OBSTETRICS & GYNECOLOGY

## 2025-01-07 PROCEDURE — 160009 HCHG ANES TIME/MIN: Performed by: OBSTETRICS & GYNECOLOGY

## 2025-01-07 PROCEDURE — 700105 HCHG RX REV CODE 258: Performed by: OBSTETRICS & GYNECOLOGY

## 2025-01-07 PROCEDURE — 700102 HCHG RX REV CODE 250 W/ 637 OVERRIDE(OP): Performed by: OBSTETRICS & GYNECOLOGY

## 2025-01-07 PROCEDURE — 770002 HCHG ROOM/CARE - OB PRIVATE (112)

## 2025-01-07 PROCEDURE — 700105 HCHG RX REV CODE 258: Performed by: ANESTHESIOLOGY

## 2025-01-07 PROCEDURE — C1755 CATHETER, INTRASPINAL: HCPCS | Performed by: OBSTETRICS & GYNECOLOGY

## 2025-01-07 PROCEDURE — 700101 HCHG RX REV CODE 250: Performed by: OBSTETRICS & GYNECOLOGY

## 2025-01-07 PROCEDURE — 160048 HCHG OR STATISTICAL LEVEL 1-5: Performed by: OBSTETRICS & GYNECOLOGY

## 2025-01-07 PROCEDURE — 302449 STATCHG TRIAGE ONLY (STATISTIC)

## 2025-01-07 PROCEDURE — 36415 COLL VENOUS BLD VENIPUNCTURE: CPT

## 2025-01-07 PROCEDURE — 303615 HCHG EPIDURAL/SPINAL ANESTHESIA FOR LABOR

## 2025-01-07 PROCEDURE — 89060 EXAM SYNOVIAL FLUID CRYSTALS: CPT

## 2025-01-07 PROCEDURE — 86780 TREPONEMA PALLIDUM: CPT

## 2025-01-07 PROCEDURE — 700111 HCHG RX REV CODE 636 W/ 250 OVERRIDE (IP): Performed by: ANESTHESIOLOGY

## 2025-01-07 PROCEDURE — 160002 HCHG RECOVERY MINUTES (STAT): Performed by: OBSTETRICS & GYNECOLOGY

## 2025-01-07 PROCEDURE — 160041 HCHG SURGERY MINUTES - EA ADDL 1 MIN LEVEL 4: Performed by: OBSTETRICS & GYNECOLOGY

## 2025-01-07 PROCEDURE — 160035 HCHG PACU - 1ST 60 MINS PHASE I: Performed by: OBSTETRICS & GYNECOLOGY

## 2025-01-07 PROCEDURE — 700101 HCHG RX REV CODE 250: Performed by: ANESTHESIOLOGY

## 2025-01-07 PROCEDURE — 160029 HCHG SURGERY MINUTES - 1ST 30 MINS LEVEL 4: Performed by: OBSTETRICS & GYNECOLOGY

## 2025-01-07 PROCEDURE — 85025 COMPLETE CBC W/AUTO DIFF WBC: CPT

## 2025-01-07 RX ORDER — CITRIC ACID/SODIUM CITRATE 334-500MG
30 SOLUTION, ORAL ORAL ONCE
Status: COMPLETED | OUTPATIENT
Start: 2025-01-07 | End: 2025-01-07

## 2025-01-07 RX ORDER — LIDOCAINE HYDROCHLORIDE 10 MG/ML
20 INJECTION, SOLUTION INFILTRATION; PERINEURAL
Status: DISCONTINUED | OUTPATIENT
Start: 2025-01-07 | End: 2025-01-08 | Stop reason: HOSPADM

## 2025-01-07 RX ORDER — LIDOCAINE HCL/EPINEPHRINE/PF 2%-1:200K
VIAL (ML) INJECTION PRN
Status: DISCONTINUED | OUTPATIENT
Start: 2025-01-07 | End: 2025-01-07 | Stop reason: SURG

## 2025-01-07 RX ORDER — ROPIVACAINE HYDROCHLORIDE 2 MG/ML
INJECTION, SOLUTION EPIDURAL; INFILTRATION; PERINEURAL CONTINUOUS
Status: DISCONTINUED | OUTPATIENT
Start: 2025-01-07 | End: 2025-01-08

## 2025-01-07 RX ORDER — CEFAZOLIN SODIUM 1 G/3ML
2 INJECTION, POWDER, FOR SOLUTION INTRAMUSCULAR; INTRAVENOUS ONCE
Status: DISCONTINUED | OUTPATIENT
Start: 2025-01-07 | End: 2025-01-08 | Stop reason: HOSPADM

## 2025-01-07 RX ORDER — SODIUM CHLORIDE, SODIUM LACTATE, POTASSIUM CHLORIDE, CALCIUM CHLORIDE 600; 310; 30; 20 MG/100ML; MG/100ML; MG/100ML; MG/100ML
INJECTION, SOLUTION INTRAVENOUS CONTINUOUS
Status: DISCONTINUED | OUTPATIENT
Start: 2025-01-07 | End: 2025-01-09 | Stop reason: HOSPADM

## 2025-01-07 RX ORDER — IBUPROFEN 800 MG/1
800 TABLET, FILM COATED ORAL
Status: DISCONTINUED | OUTPATIENT
Start: 2025-01-07 | End: 2025-01-08 | Stop reason: HOSPADM

## 2025-01-07 RX ORDER — ONDANSETRON 2 MG/ML
INJECTION INTRAMUSCULAR; INTRAVENOUS PRN
Status: DISCONTINUED | OUTPATIENT
Start: 2025-01-07 | End: 2025-01-07 | Stop reason: SURG

## 2025-01-07 RX ORDER — METOCLOPRAMIDE HYDROCHLORIDE 5 MG/ML
10 INJECTION INTRAMUSCULAR; INTRAVENOUS ONCE
Status: COMPLETED | OUTPATIENT
Start: 2025-01-07 | End: 2025-01-07

## 2025-01-07 RX ORDER — OXYTOCIN 10 [USP'U]/ML
10 INJECTION, SOLUTION INTRAMUSCULAR; INTRAVENOUS
Status: DISCONTINUED | OUTPATIENT
Start: 2025-01-07 | End: 2025-01-08 | Stop reason: HOSPADM

## 2025-01-07 RX ORDER — LABETALOL HYDROCHLORIDE 5 MG/ML
INJECTION, SOLUTION INTRAVENOUS PRN
Status: DISCONTINUED | OUTPATIENT
Start: 2025-01-07 | End: 2025-01-07 | Stop reason: SURG

## 2025-01-07 RX ORDER — AZITHROMYCIN 500 MG/5ML
500 INJECTION, POWDER, LYOPHILIZED, FOR SOLUTION INTRAVENOUS ONCE
Status: COMPLETED | OUTPATIENT
Start: 2025-01-07 | End: 2025-01-07

## 2025-01-07 RX ORDER — LEVETIRACETAM 500 MG/1
1500 TABLET ORAL 2 TIMES DAILY
Status: DISCONTINUED | OUTPATIENT
Start: 2025-01-07 | End: 2025-01-09 | Stop reason: HOSPADM

## 2025-01-07 RX ORDER — ACETAMINOPHEN 500 MG
1000 TABLET ORAL
Status: DISCONTINUED | OUTPATIENT
Start: 2025-01-07 | End: 2025-01-08 | Stop reason: HOSPADM

## 2025-01-07 RX ORDER — LAMOTRIGINE 100 MG/1
100 TABLET ORAL EVERY 12 HOURS
Status: DISCONTINUED | OUTPATIENT
Start: 2025-01-07 | End: 2025-01-09 | Stop reason: HOSPADM

## 2025-01-07 RX ORDER — SODIUM CHLORIDE, SODIUM LACTATE, POTASSIUM CHLORIDE, AND CALCIUM CHLORIDE .6; .31; .03; .02 G/100ML; G/100ML; G/100ML; G/100ML
1000 INJECTION, SOLUTION INTRAVENOUS ONCE
Status: COMPLETED | OUTPATIENT
Start: 2025-01-07 | End: 2025-01-07

## 2025-01-07 RX ORDER — CEFAZOLIN SODIUM 1 G/3ML
INJECTION, POWDER, FOR SOLUTION INTRAMUSCULAR; INTRAVENOUS PRN
Status: DISCONTINUED | OUTPATIENT
Start: 2025-01-07 | End: 2025-01-07 | Stop reason: SURG

## 2025-01-07 RX ORDER — TERBUTALINE SULFATE 1 MG/ML
0.25 INJECTION, SOLUTION SUBCUTANEOUS
Status: DISCONTINUED | OUTPATIENT
Start: 2025-01-07 | End: 2025-01-08 | Stop reason: HOSPADM

## 2025-01-07 RX ORDER — SODIUM CHLORIDE, SODIUM LACTATE, POTASSIUM CHLORIDE, AND CALCIUM CHLORIDE .6; .31; .03; .02 G/100ML; G/100ML; G/100ML; G/100ML
1000 INJECTION, SOLUTION INTRAVENOUS
Status: COMPLETED | OUTPATIENT
Start: 2025-01-07 | End: 2025-01-07

## 2025-01-07 RX ORDER — DEXAMETHASONE SODIUM PHOSPHATE 4 MG/ML
INJECTION, SOLUTION INTRA-ARTICULAR; INTRALESIONAL; INTRAMUSCULAR; INTRAVENOUS; SOFT TISSUE PRN
Status: DISCONTINUED | OUTPATIENT
Start: 2025-01-07 | End: 2025-01-07 | Stop reason: SURG

## 2025-01-07 RX ORDER — SODIUM CHLORIDE, SODIUM GLUCONATE, SODIUM ACETATE, POTASSIUM CHLORIDE AND MAGNESIUM CHLORIDE 526; 502; 368; 37; 30 MG/100ML; MG/100ML; MG/100ML; MG/100ML; MG/100ML
INJECTION, SOLUTION INTRAVENOUS
Status: DISCONTINUED | OUTPATIENT
Start: 2025-01-07 | End: 2025-01-07 | Stop reason: SURG

## 2025-01-07 RX ORDER — KETOROLAC TROMETHAMINE 15 MG/ML
INJECTION, SOLUTION INTRAMUSCULAR; INTRAVENOUS PRN
Status: DISCONTINUED | OUTPATIENT
Start: 2025-01-07 | End: 2025-01-07 | Stop reason: SURG

## 2025-01-07 RX ORDER — SODIUM CHLORIDE, SODIUM LACTATE, POTASSIUM CHLORIDE, AND CALCIUM CHLORIDE .6; .31; .03; .02 G/100ML; G/100ML; G/100ML; G/100ML
250 INJECTION, SOLUTION INTRAVENOUS PRN
Status: DISCONTINUED | OUTPATIENT
Start: 2025-01-07 | End: 2025-01-08

## 2025-01-07 RX ORDER — ROPIVACAINE HYDROCHLORIDE 2 MG/ML
INJECTION, SOLUTION EPIDURAL; INFILTRATION
Status: COMPLETED | OUTPATIENT
Start: 2025-01-07 | End: 2025-01-07

## 2025-01-07 RX ORDER — LIDOCAINE HYDROCHLORIDE AND EPINEPHRINE 15; 5 MG/ML; UG/ML
INJECTION, SOLUTION EPIDURAL
Status: COMPLETED | OUTPATIENT
Start: 2025-01-07 | End: 2025-01-07

## 2025-01-07 RX ORDER — MORPHINE SULFATE 0.5 MG/ML
INJECTION, SOLUTION EPIDURAL; INTRATHECAL; INTRAVENOUS PRN
Status: DISCONTINUED | OUTPATIENT
Start: 2025-01-07 | End: 2025-01-07 | Stop reason: SURG

## 2025-01-07 RX ORDER — ONDANSETRON 2 MG/ML
4 INJECTION INTRAMUSCULAR; INTRAVENOUS EVERY 6 HOURS PRN
Status: DISCONTINUED | OUTPATIENT
Start: 2025-01-07 | End: 2025-01-08 | Stop reason: HOSPADM

## 2025-01-07 RX ORDER — EPHEDRINE SULFATE 50 MG/ML
5 INJECTION, SOLUTION INTRAVENOUS
Status: DISCONTINUED | OUTPATIENT
Start: 2025-01-07 | End: 2025-01-08

## 2025-01-07 RX ORDER — ONDANSETRON 4 MG/1
4 TABLET, ORALLY DISINTEGRATING ORAL EVERY 6 HOURS PRN
Status: DISCONTINUED | OUTPATIENT
Start: 2025-01-07 | End: 2025-01-08 | Stop reason: HOSPADM

## 2025-01-07 RX ADMIN — MORPHINE SULFATE 3 MG: 0.5 INJECTION, SOLUTION EPIDURAL; INTRATHECAL; INTRAVENOUS at 23:06

## 2025-01-07 RX ADMIN — KETOROLAC TROMETHAMINE 15 MG: 15 INJECTION, SOLUTION INTRAMUSCULAR; INTRAVENOUS at 23:10

## 2025-01-07 RX ADMIN — SODIUM CITRATE AND CITRIC ACID MONOHYDRATE 30 ML: 334; 500 SOLUTION ORAL at 22:21

## 2025-01-07 RX ADMIN — LABETALOL HYDROCHLORIDE 5 MG: 5 INJECTION, SOLUTION INTRAVENOUS at 23:00

## 2025-01-07 RX ADMIN — OXYTOCIN 1000 ML: 10 INJECTION, SOLUTION INTRAMUSCULAR; INTRAVENOUS at 23:04

## 2025-01-07 RX ADMIN — SODIUM CHLORIDE, POTASSIUM CHLORIDE, SODIUM LACTATE AND CALCIUM CHLORIDE: 600; 310; 30; 20 INJECTION, SOLUTION INTRAVENOUS at 20:34

## 2025-01-07 RX ADMIN — AZITHROMYCIN DIHYDRATE 500 MG: 500 INJECTION, POWDER, LYOPHILIZED, FOR SOLUTION INTRAVENOUS at 21:50

## 2025-01-07 RX ADMIN — FAMOTIDINE 20 MG: 10 INJECTION, SOLUTION INTRAVENOUS at 22:22

## 2025-01-07 RX ADMIN — LAMOTRIGINE 100 MG: 100 TABLET ORAL at 19:06

## 2025-01-07 RX ADMIN — OXYTOCIN 20 MILLI-UNITS/MIN: 10 INJECTION, SOLUTION INTRAMUSCULAR; INTRAVENOUS at 21:08

## 2025-01-07 RX ADMIN — DEXAMETHASONE SODIUM PHOSPHATE 8 MG: 4 INJECTION INTRA-ARTICULAR; INTRALESIONAL; INTRAMUSCULAR; INTRAVENOUS; SOFT TISSUE at 23:10

## 2025-01-07 RX ADMIN — METOCLOPRAMIDE HYDROCHLORIDE 10 MG: 5 INJECTION INTRAMUSCULAR; INTRAVENOUS at 22:22

## 2025-01-07 RX ADMIN — OXYTOCIN 1 MILLI-UNITS/MIN: 10 INJECTION, SOLUTION INTRAMUSCULAR; INTRAVENOUS at 08:40

## 2025-01-07 RX ADMIN — LIDOCAINE HYDROCHLORIDE AND EPINEPHRINE 10 ML: 20; 5 INJECTION, SOLUTION EPIDURAL; INFILTRATION; INTRACAUDAL; PERINEURAL at 22:34

## 2025-01-07 RX ADMIN — ROPIVACAINE HYDROCHLORIDE: 2 INJECTION, SOLUTION EPIDURAL; INFILTRATION at 16:34

## 2025-01-07 RX ADMIN — LEVETIRACETAM 1500 MG: 500 TABLET, FILM COATED ORAL at 19:05

## 2025-01-07 RX ADMIN — SODIUM CHLORIDE, POTASSIUM CHLORIDE, SODIUM LACTATE AND CALCIUM CHLORIDE: 600; 310; 30; 20 INJECTION, SOLUTION INTRAVENOUS at 08:33

## 2025-01-07 RX ADMIN — CEFAZOLIN 3 G: 1 INJECTION, POWDER, FOR SOLUTION INTRAMUSCULAR; INTRAVENOUS at 22:50

## 2025-01-07 RX ADMIN — ONDANSETRON 4 MG: 2 INJECTION INTRAMUSCULAR; INTRAVENOUS at 23:10

## 2025-01-07 RX ADMIN — SODIUM CHLORIDE, POTASSIUM CHLORIDE, SODIUM LACTATE AND CALCIUM CHLORIDE: 600; 310; 30; 20 INJECTION, SOLUTION INTRAVENOUS at 10:27

## 2025-01-07 RX ADMIN — ROPIVACAINE HYDROCHLORIDE 15 ML: 2 INJECTION EPIDURAL; INFILTRATION; PERINEURAL at 09:30

## 2025-01-07 RX ADMIN — SODIUM CHLORIDE, POTASSIUM CHLORIDE, SODIUM LACTATE AND CALCIUM CHLORIDE 1000 ML: 600; 310; 30; 20 INJECTION, SOLUTION INTRAVENOUS at 09:00

## 2025-01-07 RX ADMIN — LIDOCAINE HYDROCHLORIDE AND EPINEPHRINE 10 ML: 20; 5 INJECTION, SOLUTION EPIDURAL; INFILTRATION; INTRACAUDAL; PERINEURAL at 22:47

## 2025-01-07 RX ADMIN — SODIUM CHLORIDE, SODIUM GLUCONATE, SODIUM ACETATE, POTASSIUM CHLORIDE AND MAGNESIUM CHLORIDE: 526; 502; 368; 37; 30 INJECTION, SOLUTION INTRAVENOUS at 22:37

## 2025-01-07 RX ADMIN — ROPIVACAINE HYDROCHLORIDE: 2 INJECTION, SOLUTION EPIDURAL; INFILTRATION at 09:25

## 2025-01-07 RX ADMIN — SODIUM CHLORIDE, POTASSIUM CHLORIDE, SODIUM LACTATE AND CALCIUM CHLORIDE 1000 ML: 600; 310; 30; 20 INJECTION, SOLUTION INTRAVENOUS at 22:00

## 2025-01-07 RX ADMIN — LIDOCAINE HYDROCHLORIDE,EPINEPHRINE BITARTRATE 3 ML: 15; .005 INJECTION, SOLUTION EPIDURAL; INFILTRATION; INTRACAUDAL; PERINEURAL at 09:30

## 2025-01-07 SDOH — ECONOMIC STABILITY: TRANSPORTATION INSECURITY
IN THE PAST 12 MONTHS, HAS THE LACK OF TRANSPORTATION KEPT YOU FROM MEDICAL APPOINTMENTS OR FROM GETTING MEDICATIONS?: NO

## 2025-01-07 SDOH — ECONOMIC STABILITY: TRANSPORTATION INSECURITY
IN THE PAST 12 MONTHS, HAS LACK OF RELIABLE TRANSPORTATION KEPT YOU FROM MEDICAL APPOINTMENTS, MEETINGS, WORK OR FROM GETTING THINGS NEEDED FOR DAILY LIVING?: NO

## 2025-01-07 ASSESSMENT — LIFESTYLE VARIABLES
CONSUMPTION TOTAL: NEGATIVE
ON A TYPICAL DAY WHEN YOU DRINK ALCOHOL HOW MANY DRINKS DO YOU HAVE: 0
HAVE PEOPLE ANNOYED YOU BY CRITICIZING YOUR DRINKING: NO
TOTAL SCORE: 0
DOES PATIENT WANT TO STOP DRINKING: NO
EVER HAD A DRINK FIRST THING IN THE MORNING TO STEADY YOUR NERVES TO GET RID OF A HANGOVER: NO
HAVE YOU EVER FELT YOU SHOULD CUT DOWN ON YOUR DRINKING: NO
EVER FELT BAD OR GUILTY ABOUT YOUR DRINKING: NO
TOTAL SCORE: 0
HOW MANY TIMES IN THE PAST YEAR HAVE YOU HAD 5 OR MORE DRINKS IN A DAY: 0
ALCOHOL_USE: NO
AVERAGE NUMBER OF DAYS PER WEEK YOU HAVE A DRINK CONTAINING ALCOHOL: 0
TOTAL SCORE: 0

## 2025-01-07 ASSESSMENT — PAIN DESCRIPTION - PAIN TYPE
TYPE: ACUTE PAIN

## 2025-01-07 ASSESSMENT — SOCIAL DETERMINANTS OF HEALTH (SDOH)
WITHIN THE PAST 12 MONTHS, YOU WORRIED THAT YOUR FOOD WOULD RUN OUT BEFORE YOU GOT THE MONEY TO BUY MORE: NEVER TRUE
IN THE PAST 12 MONTHS, HAS THE ELECTRIC, GAS, OIL, OR WATER COMPANY THREATENED TO SHUT OFF SERVICE IN YOUR HOME?: NO
WITHIN THE LAST YEAR, HAVE YOU BEEN KICKED, HIT, SLAPPED, OR OTHERWISE PHYSICALLY HURT BY YOUR PARTNER OR EX-PARTNER?: NO
WITHIN THE LAST YEAR, HAVE YOU BEEN AFRAID OF YOUR PARTNER OR EX-PARTNER?: NO
WITHIN THE LAST YEAR, HAVE TO BEEN RAPED OR FORCED TO HAVE ANY KIND OF SEXUAL ACTIVITY BY YOUR PARTNER OR EX-PARTNER?: NO
WITHIN THE LAST YEAR, HAVE YOU BEEN HUMILIATED OR EMOTIONALLY ABUSED IN OTHER WAYS BY YOUR PARTNER OR EX-PARTNER?: NO
WITHIN THE PAST 12 MONTHS, THE FOOD YOU BOUGHT JUST DIDN'T LAST AND YOU DIDN'T HAVE MONEY TO GET MORE: NEVER TRUE

## 2025-01-07 ASSESSMENT — PATIENT HEALTH QUESTIONNAIRE - PHQ9
SUM OF ALL RESPONSES TO PHQ9 QUESTIONS 1 AND 2: 0
2. FEELING DOWN, DEPRESSED, IRRITABLE, OR HOPELESS: NOT AT ALL
1. LITTLE INTEREST OR PLEASURE IN DOING THINGS: NOT AT ALL

## 2025-01-07 ASSESSMENT — FIBROSIS 4 INDEX: FIB4 SCORE: 0.95

## 2025-01-07 ASSESSMENT — PAIN SCALES - GENERAL: PAIN_LEVEL: 2

## 2025-01-07 NOTE — PROGRESS NOTES
S:  Pt requested and received epidural.  Discussed AROM of forebag - pt agrees    O: VSS - afebrile  FHTs 130s CAt 1  [place]: Q 4  Cx: 8/100/-2 AROM clear  Pitocin 8    A/P:  IUP at 39 5/7 weeks, active labor - doing well  1  Labor:  Progressing well.  Continue Pitocin and position changes  FWB: reassuring

## 2025-01-07 NOTE — ANESTHESIA PREPROCEDURE EVALUATION
Date: 01/07/25  Procedure: Labor Epidural         Relevant Problems   NEURO   (positive) Migraine without aura and without status migrainosus, not intractable   (positive) Seizure disorder (HCC)      CARDIAC   (positive) Migraine without aura and without status migrainosus, not intractable       Physical Exam    Airway   Mallampati: III  TM distance: >3 FB  Neck ROM: full       Cardiovascular - normal exam  Rhythm: regular  Rate: normal  (-) murmur     Dental - normal exam           Pulmonary - normal exam  Breath sounds clear to auscultation     Abdominal    Neurological - normal exam                   Anesthesia Plan    ASA 3   ASA physical status 3 criteria: morbid obesity - BMI greater than or equal to 40    Plan - epidural   Neuraxial block will be labor analgesia                  Pertinent diagnostic labs and testing reviewed    Informed Consent:    Anesthetic plan and risks discussed with patient.

## 2025-01-07 NOTE — PROGRESS NOTES
Late entry due to patient care     0705 Report from STUART Hurley. Care assumed at this time.   0720 Patient transferred from triage to labor room S220. EFM and TOCO applied.   0800 IV started via ultrasound by STUART Chawla. Labs collected.     1155 Dr. Mckee at bedside. SVE by provider 8/100/-2. AROM of fore bag at this time.     1330 SVE by RN 8/100/-2.    1530 Report given to Antonella GOODWIN RN. Transfer of care at this time.     1655 SVE by this RN. 9.5/100/-1

## 2025-01-07 NOTE — PROGRESS NOTES
EDC - 25 EGA -     0540 - Pt arrived to labor and delivery for c/o clear SROM around 0400. Pt placed in LDA 4.    0600 - External monitors in place X2. Category I FHT at this time. Pt reports good FM. No complaints of vaginal bleeding. Sterile speculum exam performed, a small amount of pooling was noted on exam. Fern collected. SVE /-2. Presentation not confirmed due to bulging bag of water. FOB at bedside. POC discussed with pt and family members, all questions answered.      0616 - Report called to Dr. Dahl regarding pt status and complaint, as well as speculum exam results, SVE, and unconfirmed presentation. Admission orders received.     0628 - Dr. Dahl at bedside, vertex presentation confirmed with US.    0705 - Report given to STUART Ward. POC discussed.

## 2025-01-07 NOTE — CARE PLAN
The patient is Stable - Low risk of patient condition declining or worsening    Shift Goals  Clinical Goals: Patient safety  Patient Goals: Healthy mom, healthy baby  Family Goals: Support    Progress made toward(s) clinical / shift goals:    Problem: Knowledge Deficit - L&D  Goal: Patient and family/caregivers will demonstrate understanding of plan of care, disease process/condition, diagnostic tests and medications  Description: Target End Date:  1-3 days or as soon as patient condition allows    1.  Oriented to unit, equipment, visitation policy and means for communicating concern  2.  Complete/review Learning Assessment  3.  Assess patient's preparation, knowledge level and expectations  4.  Provide accurate information in basic terms, clarify misconceptions  5.  Explain disease process/condition, treatment plan, diagnostic tests and medications  6.  Encourage patient and support person to ask questions and verbalize their understanding  7.  Instruct patient/support person in basic interpretation of fetal monitor  8.  Obtain informed consent when appropriate  9.  Demonstrate breathing/relaxation techniques  Outcome: Progressing  Note: ..Patient encouraged to ask RN questions about POC that the patient may require further understanding.        Problem: Pain  Goal: Patient's pain will be alleviated or reduced to the patient’s comfort goal  Description: Target End Date:  Prior to discharge or change in level of care    1.  Document pain using the appropriate pain scale per order or unit policy  2.  Administer pain medications per provider order and/or assist with epidural/spinal placement as needed  3.  Pain management medications as ordered  4.  Educate and implement non-pharmacologic comfort measures (i.e. relaxation, distraction, massage, cold/heat therapy, etc.)  5.  Assess for nonverbal signs of ineffective coping with pain and offer pain medication and/or epidural anesthesia  Outcome: Progressing  Note:  ..Patient educated on interventions for labor pain management and educated to alert RN to any pain needs.            Patient is not progressing towards the following goals:

## 2025-01-07 NOTE — PROGRESS NOTES
1530- Report received from Sylvia DAVIDSON. Pt resting comfortably in stable condition. Repositioned offered and declined at this time.    1830- Dr. Mckee at bedside. SVE as noted in flowsheet. Okay to start pushing.     1836- Started pushing.    1900- Report given to Henrietta DAVIDSON. Pt resting in between contractions/pushing in stable condition.

## 2025-01-07 NOTE — ANESTHESIA PROCEDURE NOTES
Epidural Block    Date/Time: 1/7/2025 9:16 AM    Performed by: Zay Kumari M.D.  Authorized by: Zay Kumari M.D.    Patient Location:  OB  Start Time:  1/7/2025 9:16 AM  End Time:  1/7/2025 9:30 AM  Reason for Block: labor analgesia    patient identified, IV checked, site marked, risks and benefits discussed, surgical consent, monitors and equipment checked and pre-op evaluation    Patient Position:  Sitting  Prep: ChloraPrep, patient draped and sterile technique    Monitoring:  Blood pressure, continuous pulse oximetry and heart rate  Approach:  Midline  Location:  L4-L5  Injection Technique:  LUIZ air  Skin infiltration:  Lidocaine  Strength:  1%  Dose:  3ml  Needle Type:  Tuohy  Needle Gauge:  17 G  Needle Length:  3.5 in  Loss of resistance::  5  Catheter Size:  19 G  Catheter at Skin Depth:  8  Test Dose Result:  Negative

## 2025-01-07 NOTE — H&P
"ADMISSION H AND P (DICTATED)    ADMISSION DIAGNOSIS:     IUP at 39w5d.  SROM clear fluid at 0400 - positive pool and fern positive.  GBS negative.  Active labor.  History of seizure disorder - followed by [facility] and on AED.    BP (!) 124/95   Pulse (!) 101   Temp 36.1 °C (97 °F) (Temporal)   Resp 18   Ht 1.6 m (5' 3\")   Wt (!) 127 kg (280 lb)   SpO2 94%     A, A, and O x 3 NAD    SVE: per RN at 0610 5/100%/-2    Vertex - per BSUS performed by me.    TOCO: every 4-6 minutes    EFM: category I tracing.  Reactive and without decelerations.    EFW - 3200 grams    Admission labs are pending.    A/P: 30 yo  at 39w5d by LMP and consistent with a 9w6d u/s performed on 2024.     SROM clear fluid at 0400.  Active labor.  GBS negative.  History of seizure disorder on antiepileptic drugs - lamotrigine and keppra.    "

## 2025-01-07 NOTE — H&P
DATE OF ADMISSION:  2025     ADMISSION DIAGNOSES:  1.  Intrauterine pregnancy at 39+5 weeks' gestation.  2.  Spontaneous rupture of membranes of clear fluid at 4:00 a.m. confirmed on   examination and fern positive.  3.  GBS negative.  4.  Active labor.  5.  History of seizure disorder, on antiepileptic drugs and [facility].     HISTORY OF PRESENT ILLNESS:  The patient is a 29-year-old  2, para   1-0-0-1 at 39+5 weeks' gestation based upon her last menstrual period of   2024 and consistent with a 9+6 week ultrasound performed on 2024   with an KIRSTIN of 2025.  The patient presented to labor and delivery with   the report of spontaneous rupture of membranes of a large gush of clear fluid   at 4:00 a.m. on 2025.  The patient reports excellent fetal movement.    She denies vaginal bleeding.  She reports regular and painful uterine cramping   and contractions.  On admission, she was confirmed to be ruptured and her   cervix was 5 cm, 100% effaced, -2 station at approximately 6:10 a.m.  The   patient desires an epidural for labor analgesia.  She is GBS negative.     Prenatal care with Dr. Diamond Mckee, first visit on 2024 at 9+6 weeks'   gestation.  Third trimester blood pressures 112 to 116 over 74 to 80.     PRENATAL LABS:  GBS negative on 2024.  1-hour , 3-hour GTT,   fasting 90, 1-hour 126, 2-hour 122, 3-hour 110.  Type O positive, antibody   screen negative, rubella immune, RPR nonreactive, HIV nonreactive, hepatitis B   surface antigen nonreactive, hep C viral antibody nonreactive.  GC chlamydia   negative, negative.  I do not see a copy of NIPT testing or MSAFP for open   spina bifida risk in the current chart.     OBSTETRIC ULTRASOUND:  On 2024 at 9+6 weeks' gestation, KIRSTIN 2025.     ALLERGIES:  No known drug allergies.     MEDICATIONS:  1.  Prenatal vitamins.  2.  Aspirin.  3.  Keppra.  4.  Lamotrigine.     PAST MEDICAL  HISTORY:  1.  History of seizure disorder.  2.  Obesity.     PAST SURGICAL HISTORY:  1.  LEEP procedure for CIN2.  2.  Tonsillectomy.     SOCIAL HISTORY:  She is .  She denies alcohol, tobacco or drugs of   abuse.     FAMILY HISTORY:  Noncontributory.     GYNECOLOGIC HISTORY:  History of LEEP procedure for CIN2, clear margins noted   on 2022.  No HSV.     OBSTETRICAL HISTORY:   2525 gram female infant with IUGR second-degree   perineal laceration.  She delivered at 38 weeks 2 days.     RISK FACTORS THIS PREGNANCY:  1.  Obesity.  2.  Seizure disorder, followed by [facility].  3.  History of IUGR.  4.  History of LEEP procedure.        PHYSICAL EXAMINATION ON ADMISSION:    VITAL SIGNS:  Blood pressure 124/95, pulse 101, temperature 97, respiratory   rate 18.  GENERAL:  Alert, awake and oriented x3, in no acute distress.  ABDOMEN:  Gravid, vertex by Leopold's and by bedside ultrasound performed by   me, estimated fetal weight 3200 grams.  PELVIC:  Sterile vaginal exam per the nurse at 6:10 a.m., 5 cm, 100% effaced,   -2 station.  [place], she is bernard spontaneously every 4-6 minutes.    External fetal monitoring category 1 tracing, reactive, without decelerations.  EXTREMITIES:  No clubbing, cyanosis or edema.     ASSESSMENT:  A 29-year-old  2, para 1-0-0-1 at 39+5 weeks' gestation   based upon her LMP of 2024 and consistent with a 9+6 week ultrasound   performed on 2024, who presents to labor and delivery with spontaneous   rupture of membranes of clear fluid who is in active labor and GBS negative,   who is followed by MFM and Neurology for seizure disorder and who is GBS   negative.     PLAN:  The patient will be admitted to labor and delivery.  She may have an   epidural for labor analgesia.  We will anticipate a normal spontaneous vaginal   delivery and Dr. Mckee is aware of the patient's admission.        ______________________________  Carolann Dahl  MD Teri    HTA/PRO    DD:  01/07/2025 07:18  DT:  01/07/2025 07:47    Job#:  [id number]    CC:CHANTELLE BARCENAS MD

## 2025-01-08 LAB
ERYTHROCYTE [DISTWIDTH] IN BLOOD BY AUTOMATED COUNT: 40.5 FL (ref 35.9–50)
HCT VFR BLD AUTO: 26.4 % (ref 37–47)
HGB BLD-MCNC: 8.5 G/DL (ref 12–16)
MCH RBC QN AUTO: 26.5 PG (ref 27–33)
MCHC RBC AUTO-ENTMCNC: 32.2 G/DL (ref 32.2–35.5)
MCV RBC AUTO: 82.2 FL (ref 81.4–97.8)
PLATELET # BLD AUTO: 145 K/UL (ref 164–446)
PMV BLD AUTO: 11 FL (ref 9–12.9)
RBC # BLD AUTO: 3.21 M/UL (ref 4.2–5.4)
WBC # BLD AUTO: 17.7 K/UL (ref 4.8–10.8)

## 2025-01-08 PROCEDURE — 700111 HCHG RX REV CODE 636 W/ 250 OVERRIDE (IP): Mod: JZ | Performed by: OBSTETRICS & GYNECOLOGY

## 2025-01-08 PROCEDURE — 700105 HCHG RX REV CODE 258: Performed by: OBSTETRICS & GYNECOLOGY

## 2025-01-08 PROCEDURE — 770002 HCHG ROOM/CARE - OB PRIVATE (112)

## 2025-01-08 PROCEDURE — 85027 COMPLETE CBC AUTOMATED: CPT

## 2025-01-08 PROCEDURE — A9270 NON-COVERED ITEM OR SERVICE: HCPCS | Performed by: OBSTETRICS & GYNECOLOGY

## 2025-01-08 PROCEDURE — 700102 HCHG RX REV CODE 250 W/ 637 OVERRIDE(OP): Performed by: OBSTETRICS & GYNECOLOGY

## 2025-01-08 PROCEDURE — 36415 COLL VENOUS BLD VENIPUNCTURE: CPT

## 2025-01-08 PROCEDURE — A9270 NON-COVERED ITEM OR SERVICE: HCPCS | Performed by: ANESTHESIOLOGY

## 2025-01-08 PROCEDURE — 700111 HCHG RX REV CODE 636 W/ 250 OVERRIDE (IP): Mod: JZ | Performed by: ANESTHESIOLOGY

## 2025-01-08 PROCEDURE — 700102 HCHG RX REV CODE 250 W/ 637 OVERRIDE(OP): Performed by: ANESTHESIOLOGY

## 2025-01-08 RX ORDER — IPRATROPIUM BROMIDE AND ALBUTEROL SULFATE 2.5; .5 MG/3ML; MG/3ML
3 SOLUTION RESPIRATORY (INHALATION)
Status: DISCONTINUED | OUTPATIENT
Start: 2025-01-08 | End: 2025-01-08 | Stop reason: HOSPADM

## 2025-01-08 RX ORDER — DIPHENHYDRAMINE HYDROCHLORIDE 50 MG/ML
12.5 INJECTION INTRAMUSCULAR; INTRAVENOUS
Status: DISCONTINUED | OUTPATIENT
Start: 2025-01-08 | End: 2025-01-08 | Stop reason: HOSPADM

## 2025-01-08 RX ORDER — DOCUSATE SODIUM 100 MG/1
100 CAPSULE, LIQUID FILLED ORAL 2 TIMES DAILY PRN
Status: DISCONTINUED | OUTPATIENT
Start: 2025-01-08 | End: 2025-01-09 | Stop reason: HOSPADM

## 2025-01-08 RX ORDER — DIPHENHYDRAMINE HYDROCHLORIDE 50 MG/ML
12.5 INJECTION INTRAMUSCULAR; INTRAVENOUS EVERY 6 HOURS PRN
Status: ACTIVE | OUTPATIENT
Start: 2025-01-08 | End: 2025-01-09

## 2025-01-08 RX ORDER — HYDRALAZINE HYDROCHLORIDE 20 MG/ML
5 INJECTION INTRAMUSCULAR; INTRAVENOUS
Status: DISCONTINUED | OUTPATIENT
Start: 2025-01-08 | End: 2025-01-08 | Stop reason: HOSPADM

## 2025-01-08 RX ORDER — SODIUM CHLORIDE, SODIUM LACTATE, POTASSIUM CHLORIDE, CALCIUM CHLORIDE 600; 310; 30; 20 MG/100ML; MG/100ML; MG/100ML; MG/100ML
2000 INJECTION, SOLUTION INTRAVENOUS PRN
Status: DISCONTINUED | OUTPATIENT
Start: 2025-01-08 | End: 2025-01-09 | Stop reason: HOSPADM

## 2025-01-08 RX ORDER — OXYTOCIN 10 [USP'U]/ML
10 INJECTION, SOLUTION INTRAMUSCULAR; INTRAVENOUS
Status: DISCONTINUED | OUTPATIENT
Start: 2025-01-08 | End: 2025-01-09 | Stop reason: HOSPADM

## 2025-01-08 RX ORDER — MEPERIDINE HYDROCHLORIDE 25 MG/ML
12.5 INJECTION INTRAMUSCULAR; INTRAVENOUS; SUBCUTANEOUS
Status: DISCONTINUED | OUTPATIENT
Start: 2025-01-08 | End: 2025-01-08 | Stop reason: HOSPADM

## 2025-01-08 RX ORDER — SODIUM CHLORIDE, SODIUM LACTATE, POTASSIUM CHLORIDE, AND CALCIUM CHLORIDE .6; .31; .03; .02 G/100ML; G/100ML; G/100ML; G/100ML
500 INJECTION, SOLUTION INTRAVENOUS ONCE
Status: COMPLETED | OUTPATIENT
Start: 2025-01-08 | End: 2025-01-08

## 2025-01-08 RX ORDER — OXYCODONE HCL 5 MG/5 ML
5 SOLUTION, ORAL ORAL
Status: DISCONTINUED | OUTPATIENT
Start: 2025-01-08 | End: 2025-01-08 | Stop reason: HOSPADM

## 2025-01-08 RX ORDER — HYDROMORPHONE HYDROCHLORIDE 1 MG/ML
0.4 INJECTION, SOLUTION INTRAMUSCULAR; INTRAVENOUS; SUBCUTANEOUS
Status: DISCONTINUED | OUTPATIENT
Start: 2025-01-08 | End: 2025-01-08 | Stop reason: HOSPADM

## 2025-01-08 RX ORDER — BISACODYL 10 MG
10 SUPPOSITORY, RECTAL RECTAL PRN
Status: DISCONTINUED | OUTPATIENT
Start: 2025-01-08 | End: 2025-01-09 | Stop reason: HOSPADM

## 2025-01-08 RX ORDER — ONDANSETRON 2 MG/ML
4 INJECTION INTRAMUSCULAR; INTRAVENOUS EVERY 6 HOURS PRN
Status: DISPENSED | OUTPATIENT
Start: 2025-01-08 | End: 2025-01-09

## 2025-01-08 RX ORDER — DIPHENHYDRAMINE HYDROCHLORIDE 50 MG/ML
25 INJECTION INTRAMUSCULAR; INTRAVENOUS EVERY 6 HOURS PRN
Status: ACTIVE | OUTPATIENT
Start: 2025-01-08 | End: 2025-01-09

## 2025-01-08 RX ORDER — EPHEDRINE SULFATE 50 MG/ML
5 INJECTION, SOLUTION INTRAVENOUS
Status: DISCONTINUED | OUTPATIENT
Start: 2025-01-08 | End: 2025-01-08 | Stop reason: HOSPADM

## 2025-01-08 RX ORDER — EPHEDRINE SULFATE 50 MG/ML
10 INJECTION, SOLUTION INTRAVENOUS
Status: ACTIVE | OUTPATIENT
Start: 2025-01-08 | End: 2025-01-09

## 2025-01-08 RX ORDER — ACETAMINOPHEN 500 MG
1000 TABLET ORAL EVERY 6 HOURS
Status: DISCONTINUED | OUTPATIENT
Start: 2025-01-09 | End: 2025-01-08

## 2025-01-08 RX ORDER — HALOPERIDOL 5 MG/ML
1 INJECTION INTRAMUSCULAR
Status: DISCONTINUED | OUTPATIENT
Start: 2025-01-08 | End: 2025-01-08 | Stop reason: HOSPADM

## 2025-01-08 RX ORDER — OXYCODONE HYDROCHLORIDE 5 MG/1
5 TABLET ORAL EVERY 4 HOURS PRN
Status: DISCONTINUED | OUTPATIENT
Start: 2025-01-09 | End: 2025-01-09 | Stop reason: HOSPADM

## 2025-01-08 RX ORDER — ACETAMINOPHEN 500 MG
1000 TABLET ORAL EVERY 6 HOURS
Status: DISCONTINUED | OUTPATIENT
Start: 2025-01-09 | End: 2025-01-09 | Stop reason: HOSPADM

## 2025-01-08 RX ORDER — OXYCODONE HYDROCHLORIDE 10 MG/1
10 TABLET ORAL EVERY 4 HOURS PRN
Status: DISCONTINUED | OUTPATIENT
Start: 2025-01-09 | End: 2025-01-09 | Stop reason: HOSPADM

## 2025-01-08 RX ORDER — METOPROLOL TARTRATE 1 MG/ML
1 INJECTION, SOLUTION INTRAVENOUS
Status: DISCONTINUED | OUTPATIENT
Start: 2025-01-08 | End: 2025-01-08 | Stop reason: HOSPADM

## 2025-01-08 RX ORDER — VITAMIN A ACETATE, BETA CAROTENE, ASCORBIC ACID, CHOLECALCIFEROL, .ALPHA.-TOCOPHEROL ACETATE, DL-, THIAMINE MONONITRATE, RIBOFLAVIN, NIACINAMIDE, PYRIDOXINE HYDROCHLORIDE, FOLIC ACID, CYANOCOBALAMIN, CALCIUM CARBONATE, FERROUS FUMARATE, ZINC OXIDE, CUPRIC OXIDE 3080; 12; 120; 400; 1; 1.84; 3; 20; 22; 920; 25; 200; 27; 10; 2 [IU]/1; UG/1; MG/1; [IU]/1; MG/1; MG/1; MG/1; MG/1; MG/1; [IU]/1; MG/1; MG/1; MG/1; MG/1; MG/1
1 TABLET, FILM COATED ORAL
Status: DISCONTINUED | OUTPATIENT
Start: 2025-01-08 | End: 2025-01-09 | Stop reason: HOSPADM

## 2025-01-08 RX ORDER — IBUPROFEN 800 MG/1
800 TABLET, FILM COATED ORAL EVERY 8 HOURS PRN
Status: DISCONTINUED | OUTPATIENT
Start: 2025-01-12 | End: 2025-01-09 | Stop reason: HOSPADM

## 2025-01-08 RX ORDER — SIMETHICONE 125 MG
125 TABLET,CHEWABLE ORAL 4 TIMES DAILY PRN
Status: DISCONTINUED | OUTPATIENT
Start: 2025-01-08 | End: 2025-01-09 | Stop reason: HOSPADM

## 2025-01-08 RX ORDER — IBUPROFEN 800 MG/1
800 TABLET, FILM COATED ORAL EVERY 8 HOURS
Status: DISCONTINUED | OUTPATIENT
Start: 2025-01-09 | End: 2025-01-09 | Stop reason: HOSPADM

## 2025-01-08 RX ORDER — KETOROLAC TROMETHAMINE 15 MG/ML
15 INJECTION, SOLUTION INTRAMUSCULAR; INTRAVENOUS EVERY 6 HOURS
Status: DISPENSED | OUTPATIENT
Start: 2025-01-08 | End: 2025-01-09

## 2025-01-08 RX ORDER — SODIUM CHLORIDE, SODIUM GLUCONATE, SODIUM ACETATE, POTASSIUM CHLORIDE AND MAGNESIUM CHLORIDE 526; 502; 368; 37; 30 MG/100ML; MG/100ML; MG/100ML; MG/100ML; MG/100ML
INJECTION, SOLUTION INTRAVENOUS CONTINUOUS
Status: DISCONTINUED | OUTPATIENT
Start: 2025-01-08 | End: 2025-01-09 | Stop reason: HOSPADM

## 2025-01-08 RX ORDER — SCOPOLAMINE 1 MG/3D
1 PATCH, EXTENDED RELEASE TRANSDERMAL
Status: DISCONTINUED | OUTPATIENT
Start: 2025-01-08 | End: 2025-01-09 | Stop reason: HOSPADM

## 2025-01-08 RX ORDER — SODIUM CHLORIDE 9 MG/ML
INJECTION, SOLUTION INTRAVENOUS ONCE
Status: DISCONTINUED | OUTPATIENT
Start: 2025-01-08 | End: 2025-01-09 | Stop reason: HOSPADM

## 2025-01-08 RX ORDER — DIPHENHYDRAMINE HCL 25 MG
25 TABLET ORAL EVERY 6 HOURS PRN
Status: DISCONTINUED | OUTPATIENT
Start: 2025-01-09 | End: 2025-01-09 | Stop reason: HOSPADM

## 2025-01-08 RX ORDER — LABETALOL HYDROCHLORIDE 5 MG/ML
5 INJECTION, SOLUTION INTRAVENOUS
Status: DISCONTINUED | OUTPATIENT
Start: 2025-01-08 | End: 2025-01-08 | Stop reason: HOSPADM

## 2025-01-08 RX ORDER — DIPHENHYDRAMINE HYDROCHLORIDE 50 MG/ML
25 INJECTION INTRAMUSCULAR; INTRAVENOUS EVERY 6 HOURS PRN
Status: DISCONTINUED | OUTPATIENT
Start: 2025-01-09 | End: 2025-01-09 | Stop reason: HOSPADM

## 2025-01-08 RX ORDER — ONDANSETRON 2 MG/ML
4 INJECTION INTRAMUSCULAR; INTRAVENOUS
Status: DISCONTINUED | OUTPATIENT
Start: 2025-01-08 | End: 2025-01-08 | Stop reason: HOSPADM

## 2025-01-08 RX ORDER — OXYCODONE HYDROCHLORIDE 5 MG/1
5 TABLET ORAL EVERY 4 HOURS PRN
Status: ACTIVE | OUTPATIENT
Start: 2025-01-08 | End: 2025-01-09

## 2025-01-08 RX ORDER — ONDANSETRON 2 MG/ML
4 INJECTION INTRAMUSCULAR; INTRAVENOUS EVERY 6 HOURS PRN
Status: DISCONTINUED | OUTPATIENT
Start: 2025-01-09 | End: 2025-01-09 | Stop reason: HOSPADM

## 2025-01-08 RX ORDER — ACETAMINOPHEN 500 MG
1000 TABLET ORAL EVERY 6 HOURS
Status: DISPENSED | OUTPATIENT
Start: 2025-01-08 | End: 2025-01-09

## 2025-01-08 RX ORDER — OXYCODONE HYDROCHLORIDE 10 MG/1
10 TABLET ORAL EVERY 4 HOURS PRN
Status: ACTIVE | OUTPATIENT
Start: 2025-01-08 | End: 2025-01-09

## 2025-01-08 RX ORDER — HYDROMORPHONE HYDROCHLORIDE 1 MG/ML
0.2 INJECTION, SOLUTION INTRAMUSCULAR; INTRAVENOUS; SUBCUTANEOUS
Status: DISCONTINUED | OUTPATIENT
Start: 2025-01-08 | End: 2025-01-08 | Stop reason: HOSPADM

## 2025-01-08 RX ORDER — CALCIUM CARBONATE 500 MG/1
1000 TABLET, CHEWABLE ORAL EVERY 6 HOURS PRN
Status: DISCONTINUED | OUTPATIENT
Start: 2025-01-08 | End: 2025-01-09 | Stop reason: HOSPADM

## 2025-01-08 RX ORDER — ACETAMINOPHEN 500 MG
1000 TABLET ORAL EVERY 6 HOURS PRN
Status: DISCONTINUED | OUTPATIENT
Start: 2025-01-12 | End: 2025-01-08

## 2025-01-08 RX ORDER — ONDANSETRON 4 MG/1
4 TABLET, ORALLY DISINTEGRATING ORAL EVERY 6 HOURS PRN
Status: DISCONTINUED | OUTPATIENT
Start: 2025-01-09 | End: 2025-01-09 | Stop reason: HOSPADM

## 2025-01-08 RX ORDER — OXYCODONE HCL 5 MG/5 ML
10 SOLUTION, ORAL ORAL
Status: DISCONTINUED | OUTPATIENT
Start: 2025-01-08 | End: 2025-01-08 | Stop reason: HOSPADM

## 2025-01-08 RX ORDER — HYDROMORPHONE HYDROCHLORIDE 1 MG/ML
0.4 INJECTION, SOLUTION INTRAMUSCULAR; INTRAVENOUS; SUBCUTANEOUS
Status: ACTIVE | OUTPATIENT
Start: 2025-01-08 | End: 2025-01-09

## 2025-01-08 RX ORDER — HYDROMORPHONE HYDROCHLORIDE 1 MG/ML
0.1 INJECTION, SOLUTION INTRAMUSCULAR; INTRAVENOUS; SUBCUTANEOUS
Status: DISCONTINUED | OUTPATIENT
Start: 2025-01-08 | End: 2025-01-08 | Stop reason: HOSPADM

## 2025-01-08 RX ORDER — HYDROMORPHONE HYDROCHLORIDE 1 MG/ML
0.2 INJECTION, SOLUTION INTRAMUSCULAR; INTRAVENOUS; SUBCUTANEOUS
Status: ACTIVE | OUTPATIENT
Start: 2025-01-08 | End: 2025-01-09

## 2025-01-08 RX ORDER — ACETAMINOPHEN 500 MG
1000 TABLET ORAL EVERY 6 HOURS PRN
Status: DISCONTINUED | OUTPATIENT
Start: 2025-01-12 | End: 2025-01-09 | Stop reason: HOSPADM

## 2025-01-08 RX ORDER — ENOXAPARIN SODIUM 100 MG/ML
40 INJECTION SUBCUTANEOUS EVERY 24 HOURS
Status: DISCONTINUED | OUTPATIENT
Start: 2025-01-08 | End: 2025-01-09 | Stop reason: HOSPADM

## 2025-01-08 RX ADMIN — SCOPOLAMINE 1 PATCH: 1.5 PATCH, EXTENDED RELEASE TRANSDERMAL at 05:29

## 2025-01-08 RX ADMIN — KETOROLAC TROMETHAMINE 15 MG: 15 INJECTION, SOLUTION INTRAMUSCULAR; INTRAVENOUS at 18:21

## 2025-01-08 RX ADMIN — LAMOTRIGINE 100 MG: 100 TABLET ORAL at 18:21

## 2025-01-08 RX ADMIN — LEVETIRACETAM 1500 MG: 500 TABLET, FILM COATED ORAL at 18:35

## 2025-01-08 RX ADMIN — LAMOTRIGINE 100 MG: 100 TABLET ORAL at 08:59

## 2025-01-08 RX ADMIN — KETOROLAC TROMETHAMINE 15 MG: 15 INJECTION, SOLUTION INTRAMUSCULAR; INTRAVENOUS at 05:27

## 2025-01-08 RX ADMIN — ENOXAPARIN SODIUM 40 MG: 100 INJECTION SUBCUTANEOUS at 11:19

## 2025-01-08 RX ADMIN — ACETAMINOPHEN 1000 MG: 500 TABLET ORAL at 18:21

## 2025-01-08 RX ADMIN — LEVETIRACETAM 1500 MG: 500 TABLET, FILM COATED ORAL at 05:28

## 2025-01-08 RX ADMIN — SODIUM CHLORIDE, POTASSIUM CHLORIDE, SODIUM LACTATE AND CALCIUM CHLORIDE 500 ML: 600; 310; 30; 20 INJECTION, SOLUTION INTRAVENOUS at 12:14

## 2025-01-08 RX ADMIN — ACETAMINOPHEN 1000 MG: 500 TABLET ORAL at 11:19

## 2025-01-08 RX ADMIN — OXYTOCIN 125 ML/HR: 10 INJECTION, SOLUTION INTRAMUSCULAR; INTRAVENOUS at 02:41

## 2025-01-08 RX ADMIN — ONDANSETRON 4 MG: 2 INJECTION INTRAMUSCULAR; INTRAVENOUS at 09:21

## 2025-01-08 RX ADMIN — KETOROLAC TROMETHAMINE 15 MG: 15 INJECTION, SOLUTION INTRAMUSCULAR; INTRAVENOUS at 11:19

## 2025-01-08 RX ADMIN — SODIUM CHLORIDE, POTASSIUM CHLORIDE, SODIUM LACTATE AND CALCIUM CHLORIDE 1000 ML: 600; 310; 30; 20 INJECTION, SOLUTION INTRAVENOUS at 07:58

## 2025-01-08 RX ADMIN — ACETAMINOPHEN 1000 MG: 500 TABLET ORAL at 05:30

## 2025-01-08 RX ADMIN — DIPHENHYDRAMINE HYDROCHLORIDE 12.5 MG: 50 INJECTION, SOLUTION INTRAMUSCULAR; INTRAVENOUS at 02:02

## 2025-01-08 RX ADMIN — VITAMIN A, VITAMIN C, VITAMIN D, VITAMIN E, THIAMINE, RIBOFLAVIN, NIACIN, VITAMIN B6, FOLIC ACID, VITAMIN B12, CALCIUM, IRON, ZINC, COPPER 1 TABLET: 4000; 120; 400; 22; 1.84; 3; 20; 10; 1; 12; 200; 27; 25; 2 TABLET ORAL at 07:48

## 2025-01-08 ASSESSMENT — PAIN DESCRIPTION - PAIN TYPE
TYPE: ACUTE PAIN
TYPE: ACUTE PAIN;SURGICAL PAIN
TYPE: ACUTE PAIN
TYPE: ACUTE PAIN
TYPE: ACUTE PAIN;SURGICAL PAIN
TYPE: SURGICAL PAIN
TYPE: SURGICAL PAIN
TYPE: ACUTE PAIN
TYPE: ACUTE PAIN;SURGICAL PAIN
TYPE: ACUTE PAIN;SURGICAL PAIN

## 2025-01-08 ASSESSMENT — EDINBURGH POSTNATAL DEPRESSION SCALE (EPDS)
I HAVE BLAMED MYSELF UNNECESSARILY WHEN THINGS WENT WRONG: NOT VERY OFTEN
I HAVE BEEN ABLE TO LAUGH AND SEE THE FUNNY SIDE OF THINGS: AS MUCH AS I ALWAYS COULD
THINGS HAVE BEEN GETTING ON TOP OF ME: NO, MOST OF THE TIME I HAVE COPED QUITE WELL
I HAVE BEEN SO UNHAPPY THAT I HAVE BEEN CRYING: NO, NEVER
I HAVE BEEN SO UNHAPPY THAT I HAVE HAD DIFFICULTY SLEEPING: NOT VERY OFTEN
I HAVE FELT SAD OR MISERABLE: NO, NOT AT ALL
I HAVE LOOKED FORWARD WITH ENJOYMENT TO THINGS: AS MUCH AS I EVER DID
THE THOUGHT OF HARMING MYSELF HAS OCCURRED TO ME: NEVER
I HAVE BEEN ANXIOUS OR WORRIED FOR NO GOOD REASON: HARDLY EVER
I HAVE FELT SCARED OR PANICKY FOR NO GOOD REASON: NO, NOT AT ALL

## 2025-01-08 NOTE — OP REPORT
DATE OF SERVICE:  2025     PREOPERATIVE DIAGNOSES:   1.  Intrauterine pregnancy at 39 and 5/7th weeks.  2.  Spontaneous rupture of membranes for clear fluid.  3.  Labor.  4.  Arrest of descent in the second stage.  5.  Morbid obesity.  6.  Seizure disorder.     POSTOPERATIVE DIAGNOSES:   1.  Intrauterine pregnancy at 39 and 5/7th weeks.  2.  Spontaneous rupture of membranes for clear fluid.  3.  Labor.  4.  Arrest of descent in the second stage.  5.  Morbid obesity.  6.  Seizure disorder.  7.  Cephalopelvic disproportion.  8.  Occiput posterior.  9.  Nuchal cord X 1     PROCEDURE:  Primary lower uterine segment transverse  section.     SURGEON:  Diamond Mckee MD     ASSISTANT:  Vlad Dietz MD     ANESTHESIA:  Epidural.     ANESTHESIOLOGIST:  Zay Kumari MD     ESTIMATED BLOOD LOSS:  800 mL.     FLUIDS:  1800 mL crystalloid.     URINE OUTPUT:  125 mL bloody clear urine.     FINDINGS:  Liveborn male infant, 8 pounds 7 ounces, Apgars 8 and 9, direct OP.    Clear amniotic fluid.  Normal-appearing uterus, tubes, and ovaries.    Normal-appearing placenta with 3-vessel cord. Loose nuchal cord X 1     SPECIMENS:  None.     DRAINS:  Villagomez to gravity.     COMPLICATIONS:  None apparent.     DISPOSITION:  The patient to recovery room in stable condition.     TECHNIQUE:  After adequate informed consent was obtained, the patient was   prepped for  section.  She had a vaginal prep and received 500 mg of   azithromycin IV.  In the operating room, after her vaginal prep, she had   sequential stockings placed on her lower extremities and a Villagomez catheter   previously placed to gravity.  She received 3 grams of Ancef IV as well.  She   was prepped and draped in the usual sterile fashion.  A timeout was taken to   confirm the proper patient and procedure.  Prior to prepping her abdomen,   fetal heart tones were obtained and found to be in the 130s.  After ensuring   an adequate anesthesia, a  Pfannenstiel skin incision was made and carried down   sharply to the fascia.  The fascia was nicked in the midline and dissected   bilaterally using sharp dissection.  The fascia was then  from the   rectus muscle both superiorly and inferiorly using sharp dissection.  The   peritoneum was identified and entered bluntly after ensuring no evidence of   bowel or bladder underneath and none was noted.  The peritoneum was dissected   superiorly and inferiorly using sharp dissection.  An Conrad O retractor was   placed with care taken to ensure no bowel impingement occurred and none did.    Using Metzenbaum scissors, a bladder flap was created.  Using a scalpel, a   transverse incision was made across the lower uterine segment to the level of   the amniotic sac.  The uterine incision was extended bilaterally using blunt   dissection.  The amniotic sac was encountered and ruptured and clear fluid was   noted.  The surgeon's hand was placed in the uterine cavity and the fetal   head was elevated and delivered without complications.  A loose nuchal cord was easily reduced. Mouth and nose were   bulb suctioned, and the rest of the baby delivered easily.  This is a viable   male infant, weight 8 pounds 7 ounces, Apgars 8 and 9, delivered without   complication.  There was delayed cord clamping for 30 seconds.  The cord was   then clamped twice and cut, and the infant handed off to the waiting   respiratory therapist and nursing staff.  The placenta was delivered by manual   extraction.  The patient then received 20 units of IV Pitocin and 1000 mL of   lactated Ringer's IV.  The uterine cavity was curetted using a dry lap sponge   and there was no evidence of any retained products of conception.  The uterine   incision was identified and closed using a single suture of 0 Vicryl in a   running locked fashion.  Two figure-of-eight sutures were also placed with 0   chromic to achieve good hemostasis.  The wound was  irrigated copiously and no   bleeding was noted.  The Conrad O retractor was removed.  Seprafilm was placed   across the anterior surface of the uterus and the lower uterine segment.  The   peritoneum was identified and closed using a single suture of 2-0 Vicryl in a   running fashion.  The rectus muscles were reapproximated in the midline using   a figure-of-eight suture.  The wound was irrigated copiously.  No bleeding   was noted beneath the fascia.  The fascia was reapproximated using two   separate sutures of 0 Vicryl in a running fashion overlapping in the midline.    The subcutaneous tissue was noted to be hemostatic.  Janel's fascia was   reapproximated using 3-0 Vicryl in a running fashion.  The subcutaneous tissue   was reapproximated with 3-0 Vicryl in a running fashion.  The skin was   reapproximated using 4-0 Vicryl in a running fashion.  Prevena wound VAC was   applied.  Sponge and instrument counts were correct x3.  The patient tolerated   the procedure well and there were no apparent complications.  She was taken   to recovery room in stable condition.  She will be started on Lovenox 40 mg   subcutaneous daily while in the hospital for DVT prophylaxis.        ______________________________  MD SULLY GARNER/DIONTE    DD:  01/08/2025 00:47  DT:  01/08/2025 01:51    Job#:  [id number]

## 2025-01-08 NOTE — CARE PLAN
The patient is   Problem: Knowledge Deficit - L&D  Goal: Patient and family/caregivers will demonstrate understanding of plan of care, disease process/condition, diagnostic tests and medications  Description: Target End Date:  1-3 days or as soon as patient condition allows    1.  Oriented to unit, equipment, visitation policy and means for communicating concern  2.  Complete/review Learning Assessment  3.  Assess patient's preparation, knowledge level and expectations  4.  Provide accurate information in basic terms, clarify misconceptions  5.  Explain disease process/condition, treatment plan, diagnostic tests and medications  6.  Encourage patient and support person to ask questions and verbalize their understanding  7.  Instruct patient/support person in basic interpretation of fetal monitor  8.  Obtain informed consent when appropriate  9.  Demonstrate breathing/relaxation techniques  Outcome: Progressing     Problem: Psychosocial - L&D  Goal: Patient's level of anxiety will decrease  Description: Target End Date:  1-3 days or as soon as patient condition allows    1.  Collaborate with patient and family/caregiver to identify triggers and develop strategies to cope with anxiety  2.  Implement stimuli reduction, calming techniques  3.  Pharmacologic management per provider order  4.  Encourage patient/family/care giver participation  5.  Collaborate with interdisciplinary team including Psychologist or Behavioral Health Team as needed  Outcome: Progressing  Goal: Patient will be able to discuss coping skills during hospitalization  Description: Target End Date:  1 to 3 days    1.  Assess patient and support person's emotional response  2.  Review patient and support person's participation and role in birth experience.  Identify positive behaviors during the process  3.  Encourage presence/participation of support person  Outcome: Progressing  Goal: Patient's ability to re-evaluate and adapt role responsibilities  will improve  Description: Target End Date:  Prior to discharge or change in level of care    1.  Assess family support  2.  Encourage support system participation in care  3.  Encouraged verbalization of feelings regarding caregiver responsibilities  4.  Discuss changes in role and responsibilities caused by patient's condition  Outcome: Progressing  Goal: Spiritual and cultural needs incorporated into hospitalization  Description: Target End Date:  End of day 1    1.  Encourage verbalization of feelings, concerns, expectations and needs  2.  Collaborate with Case Management/  3.  Collaborate with Pastoral Care to meet spiritual needs  Outcome: Progressing     Problem: Risk for Venous Thromboembolism (VTE)  Goal: VTE prevention measures will be implemented and patient will remain free from VTE  Description: Target End Date:  Prior to discharge or change in level of care    1.  Intermittent sequential compression device in place 23 hours per day or per provider order  2.  Pharmacologic prophylaxis per provider order  3.  Monitor for anticoagulation complications  4.  Educate patient and family/caregiver about the importance of intermittent sequential and early ambulation when able  5.  Educate/demonstrate to patient and family/caregiver about ankle flex, foot rotation and knee flex exercises in addition to other prophylactic measures every hour while awake  Outcome: Progressing     Problem: Risk for Infection and Impaired Wound Healing  Goal: Patient will remain free from infection  Description: Target End Date:  1 to 3 days    1.  Utilize Standard Precautions at all times to reduce the risk of transmission of microorganisms from both recognized and unrecognized sources of infection  2.  Infection prevention handouts provided (general/device/diagnosis specific) and documented in Patient Education  3.  Limit vaginal exams as necessary  4.  Use aseptic technique during vaginal exams  5.  Administer  antibiotic therapy per provider order  6.  Assess for removal of lines and drains  7.  Line/drain care per policy and/or provider orders  Outcome: Progressing  Goal: Patient's wound/surgical incision will decrease in size and heals properly  Description: Target End Date:  Prior to discharge or change in level of care    Document on LDA    1.  Assess and document surgical incision/wound  2.  Provide incision/wound care per policy and/or provider orders  3.  Manage surgical drains per policy if applicable  4.  Encourage adequate nutrition to promote wound healing  5.  Collaborate with Clinical Dietician  Outcome: Progressing     Problem: Risk for Fluid Imbalance  Goal: Patient's fluid volume balance will be maintained or improve  Description: Target End Date:  1 to 3 days    1.  Assess for signs and symptoms of postpartum and postoperative bleeding as appropriate  2.  Monitor drainage for color, consistency, quantity  3.  Report inadequate intake or output to provider  4.  Promote oral intake as appropriate  5.  Administer IV therapy as ordered  6.  Discontinue IV fluids when tolerating PO or per provider order  Outcome: Progressing     Problem: Risk for Injury  Goal: Patient and fetus will be free of preventable injury/complications  Description: Target End Date:  Prior to discharge or change in level of care    1.  Monitor uterine activity and if applicable progression of labor  2.  Monitor fetal well being  3.  Assure resuscitative equipment is available and within reach  Outcome: Progressing     Problem: Pain  Goal: Patient's pain will be alleviated or reduced to the patient’s comfort goal  Description: Target End Date:  Prior to discharge or change in level of care    1.  Document pain using the appropriate pain scale per order or unit policy  2.  Administer pain medications per provider order and/or assist with epidural/spinal placement as needed  3.  Pain management medications as ordered  4.  Educate and  implement non-pharmacologic comfort measures (i.e. relaxation, distraction, massage, cold/heat therapy, etc.)  5.  Assess for nonverbal signs of ineffective coping with pain and offer pain medication and/or epidural anesthesia  Outcome: Progressing     Problem: Discharge Barriers/Planning  Goal: Patient's continuum of care needs are met  Description: Target End Date:  Prior to discharge or change in level of care    1.  Identify potential discharge barriers on admission and throughout hospitalization  2.  Collaborate with Case Management, , Clinical Educators, Navigators and others on the transitional care team to meet discharge needs  3.  Involve patient/family/caregivers in setting and prioritizing goals for hospitalization and discharge  4.  Ensure Flu vaccinations are addressed  5.  Inquire if patient is interested in the Meds to Bed program  6.  Ensure patient and family/caregiver are able to demonstrate use of equipment as prescribed  7.  Ensure patient and family/caregiver can verbalize understanding of patient education  8.  Explain discharge instructions and medication reconciliation to patient and family/caregiver  Outcome: Progressing     Problem: Pain - Standard  Goal: Alleviation of pain or a reduction in pain to the patient’s comfort goal  Description: Target End Date:  Prior to discharge or change in level of care    Document on Vitals flowsheet    1.  Document pain using the appropriate pain scale per order or unit policy  2.  Educate and implement non-pharmacologic comfort measures (i.e. relaxation, distraction, massage, cold/heat therapy, etc.)  3.  Pain management medications as ordered  4.  Reassess pain after pain med administration per policy  5.  If opiods administered assess patient's response to pain medication is appropriate per POSS sedation scale  6.  Follow pain management plan developed in collaboration with patient and interdisciplinary team (including palliative care or  pain specialists if applicable)  Outcome: Progressing     Problem: Knowledge Deficit - Standard  Goal: Patient and family/care givers will demonstrate understanding of plan of care, disease process/condition, diagnostic tests and medications  Description: Target End Date:  1-3 days or as soon as patient condition allows    Document in Patient Education    1.  Patient and family/caregiver oriented to unit, equipment, visitation policy and means for communicating concern  2.  Complete/review Learning Assessment  3.  Assess knowledge level of disease process/condition, treatment plan, diagnostic tests and medications  4.  Explain disease process/condition, treatment plan, diagnostic tests and medications  Outcome: Progressing       Shift Goals  Clinical Goals: Patient safety  Patient Goals: Healthy mom, healthy baby  Family Goals: Support    Progress made toward(s) clinical / shift goals:    Problem: Knowledge Deficit - L&D  Goal: Patient and family/caregivers will demonstrate understanding of plan of care, disease process/condition, diagnostic tests and medications  Description: Target End Date:  1-3 days or as soon as patient condition allows    1.  Oriented to unit, equipment, visitation policy and means for communicating concern  2.  Complete/review Learning Assessment  3.  Assess patient's preparation, knowledge level and expectations  4.  Provide accurate information in basic terms, clarify misconceptions  5.  Explain disease process/condition, treatment plan, diagnostic tests and medications  6.  Encourage patient and support person to ask questions and verbalize their understanding  7.  Instruct patient/support person in basic interpretation of fetal monitor  8.  Obtain informed consent when appropriate  9.  Demonstrate breathing/relaxation techniques  Outcome: Progressing     Problem: Psychosocial - L&D  Goal: Patient's level of anxiety will decrease  Description: Target End Date:  1-3 days or as soon as  patient condition allows    1.  Collaborate with patient and family/caregiver to identify triggers and develop strategies to cope with anxiety  2.  Implement stimuli reduction, calming techniques  3.  Pharmacologic management per provider order  4.  Encourage patient/family/care giver participation  5.  Collaborate with interdisciplinary team including Psychologist or Behavioral Health Team as needed  Outcome: Progressing  Goal: Patient will be able to discuss coping skills during hospitalization  Description: Target End Date:  1 to 3 days    1.  Assess patient and support person's emotional response  2.  Review patient and support person's participation and role in birth experience.  Identify positive behaviors during the process  3.  Encourage presence/participation of support person  Outcome: Progressing  Goal: Patient's ability to re-evaluate and adapt role responsibilities will improve  Description: Target End Date:  Prior to discharge or change in level of care    1.  Assess family support  2.  Encourage support system participation in care  3.  Encouraged verbalization of feelings regarding caregiver responsibilities  4.  Discuss changes in role and responsibilities caused by patient's condition  Outcome: Progressing  Goal: Spiritual and cultural needs incorporated into hospitalization  Description: Target End Date:  End of day 1    1.  Encourage verbalization of feelings, concerns, expectations and needs  2.  Collaborate with Case Management/  3.  Collaborate with Pastoral Care to meet spiritual needs  Outcome: Progressing     Problem: Risk for Venous Thromboembolism (VTE)  Goal: VTE prevention measures will be implemented and patient will remain free from VTE  Description: Target End Date:  Prior to discharge or change in level of care    1.  Intermittent sequential compression device in place 23 hours per day or per provider order  2.  Pharmacologic prophylaxis per provider order  3.   Monitor for anticoagulation complications  4.  Educate patient and family/caregiver about the importance of intermittent sequential and early ambulation when able  5.  Educate/demonstrate to patient and family/caregiver about ankle flex, foot rotation and knee flex exercises in addition to other prophylactic measures every hour while awake  Outcome: Progressing     Problem: Risk for Infection and Impaired Wound Healing  Goal: Patient will remain free from infection  Description: Target End Date:  1 to 3 days    1.  Utilize Standard Precautions at all times to reduce the risk of transmission of microorganisms from both recognized and unrecognized sources of infection  2.  Infection prevention handouts provided (general/device/diagnosis specific) and documented in Patient Education  3.  Limit vaginal exams as necessary  4.  Use aseptic technique during vaginal exams  5.  Administer antibiotic therapy per provider order  6.  Assess for removal of lines and drains  7.  Line/drain care per policy and/or provider orders  Outcome: Progressing  Goal: Patient's wound/surgical incision will decrease in size and heals properly  Description: Target End Date:  Prior to discharge or change in level of care    Document on LDA    1.  Assess and document surgical incision/wound  2.  Provide incision/wound care per policy and/or provider orders  3.  Manage surgical drains per policy if applicable  4.  Encourage adequate nutrition to promote wound healing  5.  Collaborate with Clinical Dietician  Outcome: Progressing     Problem: Risk for Fluid Imbalance  Goal: Patient's fluid volume balance will be maintained or improve  Description: Target End Date:  1 to 3 days    1.  Assess for signs and symptoms of postpartum and postoperative bleeding as appropriate  2.  Monitor drainage for color, consistency, quantity  3.  Report inadequate intake or output to provider  4.  Promote oral intake as appropriate  5.  Administer IV therapy as  ordered  6.  Discontinue IV fluids when tolerating PO or per provider order  Outcome: Progressing     Problem: Risk for Injury  Goal: Patient and fetus will be free of preventable injury/complications  Description: Target End Date:  Prior to discharge or change in level of care    1.  Monitor uterine activity and if applicable progression of labor  2.  Monitor fetal well being  3.  Assure resuscitative equipment is available and within reach  Outcome: Progressing     Problem: Pain  Goal: Patient's pain will be alleviated or reduced to the patient’s comfort goal  Description: Target End Date:  Prior to discharge or change in level of care    1.  Document pain using the appropriate pain scale per order or unit policy  2.  Administer pain medications per provider order and/or assist with epidural/spinal placement as needed  3.  Pain management medications as ordered  4.  Educate and implement non-pharmacologic comfort measures (i.e. relaxation, distraction, massage, cold/heat therapy, etc.)  5.  Assess for nonverbal signs of ineffective coping with pain and offer pain medication and/or epidural anesthesia  Outcome: Progressing     Problem: Discharge Barriers/Planning  Goal: Patient's continuum of care needs are met  Description: Target End Date:  Prior to discharge or change in level of care    1.  Identify potential discharge barriers on admission and throughout hospitalization  2.  Collaborate with Case Management, , Clinical Educators, Navigators and others on the transitional care team to meet discharge needs  3.  Involve patient/family/caregivers in setting and prioritizing goals for hospitalization and discharge  4.  Ensure Flu vaccinations are addressed  5.  Inquire if patient is interested in the Meds to Bed program  6.  Ensure patient and family/caregiver are able to demonstrate use of equipment as prescribed  7.  Ensure patient and family/caregiver can verbalize understanding of patient  education  8.  Explain discharge instructions and medication reconciliation to patient and family/caregiver  Outcome: Progressing     Problem: Pain - Standard  Goal: Alleviation of pain or a reduction in pain to the patient’s comfort goal  Description: Target End Date:  Prior to discharge or change in level of care    Document on Vitals flowsheet    1.  Document pain using the appropriate pain scale per order or unit policy  2.  Educate and implement non-pharmacologic comfort measures (i.e. relaxation, distraction, massage, cold/heat therapy, etc.)  3.  Pain management medications as ordered  4.  Reassess pain after pain med administration per policy  5.  If opiods administered assess patient's response to pain medication is appropriate per POSS sedation scale  6.  Follow pain management plan developed in collaboration with patient and interdisciplinary team (including palliative care or pain specialists if applicable)  Outcome: Progressing     Problem: Knowledge Deficit - Standard  Goal: Patient and family/care givers will demonstrate understanding of plan of care, disease process/condition, diagnostic tests and medications  Description: Target End Date:  1-3 days or as soon as patient condition allows    Document in Patient Education    1.  Patient and family/caregiver oriented to unit, equipment, visitation policy and means for communicating concern  2.  Complete/review Learning Assessment  3.  Assess knowledge level of disease process/condition, treatment plan, diagnostic tests and medications  4.  Explain disease process/condition, treatment plan, diagnostic tests and medications  Outcome: Progressing       Patient is not progressing towards the following goals:

## 2025-01-08 NOTE — PROGRESS NOTES
0750- Assessment completed. Fundus firm, lochia light. Abd. incisions with wound vac dressing intact. Plan of care reviewed. Denies pain at this time, will call if pain med intervention needed. Call light within reach, bed at lowest position, and shows no signs of distress at this time.    0915- Patient ambulated to the restroom with a wide stance, but tolerates well.    1205- Called Dr. Grossman regarding patient's low urine output. MD gave verbal order for 500 cc of LR bolus. Orders placed.    1615- Villagomez removed.

## 2025-01-08 NOTE — CARE PLAN
The patient is Stable - Low risk of patient condition declining or worsening    Shift Goals  Clinical Goals: Delivery of infant  Patient Goals: Healthy mom/ Healthy baby  Family Goals: Support    Progress made toward(s) clinical / shift goals:    Problem: Risk for Infection and Impaired Wound Healing  Goal: Patient will remain free from infection  Outcome: Progressing  Note: Limit vaginal exams as necessary. Use aseptic technique during vaginal exams. Administer antibiotic therapy per provider order.     Problem: Pain  Goal: Patient's pain will be alleviated or reduced to the patient’s comfort goal  Outcome: Progressing  Note: Document pain using the appropriate pain scale per order or unit policy. Administer pain medications per provider order.       Patient is not progressing towards the following goals:

## 2025-01-08 NOTE — PROGRESS NOTES
Pt has been pushing for 3 hurs.  There is caput, but the [employer] is still at 0 station.  P/C/R/B of proceeding with attempt at a vaginal delivery versus primary C/S for arrest of descent reviewed.  Pt agrees to C/S.  Consents signed.  Plan for vaginal prep and Zitrhomax 500 mg IV X 1 preop.

## 2025-01-08 NOTE — PROGRESS NOTES
POD #1    S:  Pt doing well.  Villagomez in place.  No flatus.  Oc clears.  Working on breast feeding.  Pain controlled.  Discussed indication for Lovenox while in hospital    O:  T 97  /67 P 74  ABD: Obese ND, minimally tender, BS + normoactive, FF below umb, Pervena dressing intact  EXT: SCDs in place.  + 2 pedal edema, no cords or Homans  H/H pending - was 10/33 on admission  O+    A/P:  POD #1 S/P Primary C/S for arrest of descent - doing well   Continue routine pp/post op care  F/U on H/H  Sz D/O:  Pt  denies any issues at this time. Meds ordered.   Lovenox 40 mg SQ QD starting at 1100 hours.  Keep SCDs in plae while in bed

## 2025-01-08 NOTE — PROGRESS NOTES
- Report received from Antonella DAVIDSON. POC discussed, questions answered. Assumed care with patient in stable condition.    - Dr. Mckee at bedside. Decision made to proceed with primary  d/t AOD.    - Delivery of viable male infant with APGARS 8/9. .    0220- Pt transferred to postpartum via gurney with infant in arms. Report given to Kevin DAVIDSON. POC discussed, questions answered. Pt in stable condition, care relinquished at this time.

## 2025-01-08 NOTE — CARE PLAN
The patient is Stable - Low risk of patient condition declining or worsening    Shift Goals  Clinical Goals: Pain control, Vitals signs WDL, Lochia WDL  Patient Goals:  Family Goals:    Progress made toward(s) clinical / shift goals:    Problem: Early Mobilization - Post Surgery  Goal: Early mobilization post surgery  Outcome: Progressing  Note: Patient ambulates to the restroom with a wide stance, but tolerates well.     Problem: Altered Physiologic Condition  Goal: Patient physiologically stable as evidenced by normal lochia, palpable uterine involution and vitals within normal limits  Outcome: Progressing  Note: Patient's fundus firm, at umbilicus with light bleeding through out this shift.

## 2025-01-08 NOTE — ANESTHESIA POSTPROCEDURE EVALUATION
Patient: Laura Rosario    Procedure Summary       Date: 25 Room / Location: [facility] OR 01 / SURGERY LABOR AND DELIVERY    Anesthesia Start: 905 Anesthesia Stop:     Procedure:  SECTION, PRIMARY (Abdomen) Diagnosis:        delivery delivered      (Failure to descend)    Surgeons: Diamond Mckee M.D. Responsible Provider: Zay Kumari M.D.    Anesthesia Type: epidural ASA Status: 3            Final Anesthesia Type: epidural  Last vitals  BP   Blood Pressure: 136/82    Temp   36.3 °C (97.3 °F)    Pulse   (!) 106   Resp   18    SpO2   94 %      Anesthesia Post Evaluation    Patient location during evaluation: PACU  Patient participation: complete - patient participated  Level of consciousness: awake and alert  Pain score: 2    Airway patency: patent  Anesthetic complications: no  Cardiovascular status: hemodynamically stable  Respiratory status: acceptable  Hydration status: euvolemic    PONV: none          There were no known notable events for this encounter.

## 2025-01-08 NOTE — LACTATION NOTE
This note was copied from a baby's chart.  Initial Visit:    39w5d infant delivered via c section to a  mother 25 at 2302    Recently documented latch score 5    Feeding Plan: breastfeeding    Breastfeeding History: Laura breast fed her first child for six weeks. Breastfeeding was difficult due to a poor latch, nipple shield use and low milk supply.     Medical History: Elevated BMI, seizure disorder. Seizure meds both L2.     Laura reports that she has attempted to breastfeed her baby a few times since delivery. He has been mostly sleepy and not interested in breastfeeding. LC offered assistance and she agrees.     Bilateral breasts are soft and pendulous. Edema noted in areola, nipples intact, no damage noted. Right nipple everts, left side is pseudoinverted. Areolar tissue is firm.     Hand expression demonstrated, with permission. Drops of colostrum were expressed without difficulty.     Baby placed skin to skin in football position on the right side. LC assisted with proper positioning, breast wedging and asymmetrical latch technique. Baby slept through several attempts to latch, and was not able to rouse to actively feed at the breasts. Several drops of colostrum were expressed into his mouth.    Hand pump ordered to assist with nipple eversion for the next feeding.    Breast feeding education provided: Education provided regarding the milk making process and supply in demand. Frequent skin to skin encouraged. Encouraged MOB to offer the breast to baby any time he is showing hunger cues (cues reviewed). Encouraged MOB to allow baby to self limit at the breast. Anticipatory guidance provided regarding typical  feeding behaviors in the first 24-48hrs, including cluster feeding. Proper positioning and latch technique verbalized. Education provided regarding the importance of achieving a deep latch with each feeding to ensure proper stimulation, milk transfer, and reduce the chance for nipple  damage/pain.     Plan: Continue offering the breast to baby on cue, a minimum of 8 times every 24hrs. Skin to skin for each feeding. Hand expression and feed back colostrum (with RN assistance) if baby due to feed, but too sleepy or unable to latch. Do not limit baby's time at the breast. Reach out to RN/LC if experiencing pain with latch or if unable to latch baby independently. If at 24hrs of life (2302), an optimal latch score is not achieved, a three step feeding plan should be initiated by the bedside RN.

## 2025-01-08 NOTE — ANESTHESIA TIME REPORT
Anesthesia Start and Stop Event Times       Date Time Event    1/7/2025 0901 Ready for Procedure     0905 Anesthesia Start     2352 Anesthesia Stop          Responsible Staff  01/07/25      Name Role Begin End    Zay Kumari M.D. Anesth 0905 2356          Overtime Reason:  no overtime (within assigned shift)    Comments:

## 2025-01-09 ENCOUNTER — PHARMACY VISIT (OUTPATIENT)
Dept: PHARMACY | Facility: MEDICAL CENTER | Age: 30
End: 2025-01-09
Payer: COMMERCIAL

## 2025-01-09 VITALS
DIASTOLIC BLOOD PRESSURE: 70 MMHG | SYSTOLIC BLOOD PRESSURE: 122 MMHG | HEIGHT: 63 IN | BODY MASS INDEX: 49.61 KG/M2 | OXYGEN SATURATION: 98 % | TEMPERATURE: 98.5 F | RESPIRATION RATE: 18 BRPM | HEART RATE: 55 BPM | WEIGHT: 280 LBS

## 2025-01-09 PROCEDURE — A9270 NON-COVERED ITEM OR SERVICE: HCPCS | Performed by: OBSTETRICS & GYNECOLOGY

## 2025-01-09 PROCEDURE — RXMED WILLOW AMBULATORY MEDICATION CHARGE: Performed by: OBSTETRICS & GYNECOLOGY

## 2025-01-09 PROCEDURE — 700102 HCHG RX REV CODE 250 W/ 637 OVERRIDE(OP): Performed by: OBSTETRICS & GYNECOLOGY

## 2025-01-09 RX ORDER — PSEUDOEPHEDRINE HCL 30 MG
100 TABLET ORAL 2 TIMES DAILY PRN
Qty: 60 CAPSULE | Refills: 1 | Status: SHIPPED | OUTPATIENT
Start: 2025-01-09

## 2025-01-09 RX ORDER — IBUPROFEN 800 MG/1
TABLET, FILM COATED ORAL
Qty: 60 TABLET | Refills: 1 | Status: SHIPPED | OUTPATIENT
Start: 2025-01-09 | End: 2025-03-24

## 2025-01-09 RX ADMIN — ACETAMINOPHEN 1000 MG: 500 TABLET ORAL at 05:55

## 2025-01-09 RX ADMIN — LEVETIRACETAM 1500 MG: 500 TABLET, FILM COATED ORAL at 05:56

## 2025-01-09 RX ADMIN — LAMOTRIGINE 100 MG: 100 TABLET ORAL at 18:02

## 2025-01-09 RX ADMIN — ACETAMINOPHEN 1000 MG: 500 TABLET ORAL at 12:31

## 2025-01-09 RX ADMIN — LEVETIRACETAM 1500 MG: 500 TABLET, FILM COATED ORAL at 18:02

## 2025-01-09 RX ADMIN — IBUPROFEN 800 MG: 800 TABLET, FILM COATED ORAL at 04:55

## 2025-01-09 RX ADMIN — Medication 1 TABLET: at 08:01

## 2025-01-09 RX ADMIN — Medication 1 TABLET: at 18:02

## 2025-01-09 RX ADMIN — VITAMIN A, VITAMIN C, VITAMIN D, VITAMIN E, THIAMINE, RIBOFLAVIN, NIACIN, VITAMIN B6, FOLIC ACID, VITAMIN B12, CALCIUM, IRON, ZINC, COPPER 1 TABLET: 4000; 120; 400; 22; 1.84; 3; 20; 10; 1; 12; 200; 27; 25; 2 TABLET ORAL at 08:02

## 2025-01-09 RX ADMIN — IBUPROFEN 800 MG: 800 TABLET, FILM COATED ORAL at 14:21

## 2025-01-09 RX ADMIN — ACETAMINOPHEN 1000 MG: 500 TABLET ORAL at 18:02

## 2025-01-09 RX ADMIN — LAMOTRIGINE 100 MG: 100 TABLET ORAL at 06:34

## 2025-01-09 ASSESSMENT — PAIN DESCRIPTION - PAIN TYPE
TYPE: ACUTE PAIN
TYPE: SURGICAL PAIN;ACUTE PAIN
TYPE: ACUTE PAIN
TYPE: ACUTE PAIN;SURGICAL PAIN
TYPE: ACUTE PAIN
TYPE: ACUTE PAIN

## 2025-01-09 NOTE — PROGRESS NOTES
POD 1+---      ___________________    POSTOPERATIVE DIAGNOSES:   1.  Intrauterine pregnancy at 39 and 5/7th weeks.  2.  Spontaneous rupture of membranes for clear fluid.  3.  Labor.  4.  Arrest of descent in the second stage.  5.  Morbid obesity.  6.  Seizure disorder.  7.  Cephalopelvic disproportion.  8.  Occiput posterior.  9.  Nuchal cord X 1  ___________________    UO adequate  + flatus, tolerating clear liquids  Well, denies N/V    LAB:       25 08:05 25 16:04   WBC 12.2 (H) 17.7 (H)   Hemoglobin 10.5 (L) 8.5 (L)   Hematocrit 33.0 (L) 26.4 (L)   Platelet Count 160 (L) 145 (L)       PE:     Afebrile  BP normal    Lungs clear to auscultation  Abdomen soft, flat, bowel sounds present and normal  Prevena Wound dressing in place, waterproof barrier intact  Calves nontender, Jane sign negative bilaterally  Back:  No CVA tenderness     PLAN:     FoliTab 500 BID, PNV daily.  Prevena wound-vac.  Cont. Anti-seizure meds.  Postop care, advance diet as tolerated, increase activity as tolerated.

## 2025-01-09 NOTE — CARE PLAN
The patient is Stable - Low risk of patient condition declining or worsening    Shift Goals  Clinical Goals: monitor wound vac, void  Patient Goals: void  Family Goals: support mom      Problem: Knowledge Deficit - L&D  Goal: Patient and family/caregivers will demonstrate understanding of plan of care, disease process/condition, diagnostic tests and medications  Outcome: Progressing  Note: Patient will demonstrate understanding of POC and when to alert care team of any distress.     Problem: Psychosocial - L&D  Goal: Patient's level of anxiety will decrease  Outcome: Progressing  Flowsheets (Taken 1/9/2025 0027)  Decrease Anxiety Level:   Implemented stimuli reduction, calming techniques   Encouraged support system participation  Note: Patient and RN will work on latching technique for baby while breastfeeding and implement therapeutic communication.

## 2025-01-09 NOTE — CARE PLAN
The patient is Stable - Low risk of patient condition declining or worsening    Shift Goals  Clinical Goals: Pain control, Vitals signs WDL, Lochia WDL  Patient Goals:   Family Goals:     Progress made toward(s) clinical / shift goals:    Problem: Early Mobilization - Post Surgery  Goal: Early mobilization post surgery  Outcome: Progressing  Note: Patient ambulates with a steady gait and tolerates well through out this shift.     Problem: Altered Physiologic Condition  Goal: Patient physiologically stable as evidenced by normal lochia, palpable uterine involution and vitals within normal limits  Outcome: Progressing  Note: Patient's fundus firm, two below umbilicus with scant bleeding through out this shift.

## 2025-01-09 NOTE — PROGRESS NOTES
0800- Assessment completed. Fundus firm, lochia light. Abd. incisions with would vac dressing intact. Plan of care reviewed. Denies pain at this time, will call if pain med intervention needed. Call light within reach, bed at lowest position, and shows no signs of distress at this time.    1416- Patient stated she would like to discharge today if possible. This RN called Dr. Mon and she will come assess patient first then decide.    1900- Discharge education and follow up information reviewed with patient, who verbalizes understanding and papers were signed. Prescribed meds and information provided, patient verbalized understanding.     1924- Left facility escorted by staff.

## 2025-01-09 NOTE — LACTATION NOTE
This note was copied from a baby's chart.  Met with Laura for a follow up lactation consultation.     She began a three step feeding plan overnight due to poor latch. Baby has been taking his formula supplements without difficulty.     LC offered latch assistance and mom agrees.     Baby placed in football position on the right side. LC placed a nipple shield, and several attempts were made to latch baby. He did eventually latch onto the shield, but did not maintain this for more than a few sucks. LC removed the shield and he did latch to the breast for about 1-2 minutes at a time, before coming off and crying. He was offered his supplemental formula, and dad was educated regarding paced bottle feeding.     Mom pumped while dad offered baby his supplemental feeding. 25mm flanges fit well, no discomfort, and Kusum is using appropriate pump settings.     Baby noted to have difficulty opening his mouth with a wide gape. LC demonstrated guppy pose, and encouraged parents to allow baby to stretch to help to loosen his jaw and tight upper body muscles.     Plan: Continue to breast feed baby per cues, at least once every three hours. Follow breast feeding with supplementation, with volumes outlined by provider, or on Supplemental Feeding Guidelines handout. Pump (80cpm down to 60cpm after 2min, suction to comfort, for 15 total minutes), every three hours or a minimum of 8-12 times every 24hrs. [facility] Breastfeeding Resource handout provided. Will place outpatient consult referral with [facility].

## 2025-01-09 NOTE — PROGRESS NOTES
"Assumed care of patient at bedside report from day shift RN. Updated on plan of care.   Patient currently A & O x 4; on  room air. Able to turn self in bed; without any current complaints of acute pain, medicated per MAR. Assessment completed. Patient's last bowel movement 1/8 .   Call light within reach. Hourly rounding and fall precautions in place. Bed locked and in lowest position. All questions answered. No other needs indicated at this time.    Most recent vital signs  /65   Pulse 62   Temp 37.1 °C (98.7 °F) (Temporal)   Resp 18   Ht 1.6 m (5' 3\")   Wt (!) 127 kg (280 lb)   SpO2 94%   Breastfeeding Yes   BMI 49.60 kg/m²       Patient has wound vac in place, functioning properly. Patient power removed today, needs to void by 2215.   "

## 2025-01-10 NOTE — DISCHARGE INSTRUCTIONS

## 2025-02-21 ENCOUNTER — HOSPITAL ENCOUNTER (OUTPATIENT)
Facility: MEDICAL CENTER | Age: 30
End: 2025-02-21
Attending: OBSTETRICS & GYNECOLOGY
Payer: COMMERCIAL

## 2025-02-21 PROCEDURE — 88142 CYTOPATH C/V THIN LAYER: CPT

## 2025-02-26 LAB — THINPREP PAP, CYTOLOGY NL11781: NORMAL

## 2025-03-15 ENCOUNTER — HOSPITAL ENCOUNTER (OUTPATIENT)
Dept: LAB | Facility: MEDICAL CENTER | Age: 30
End: 2025-03-15
Attending: INTERNAL MEDICINE
Payer: COMMERCIAL

## 2025-03-15 LAB
EST. AVERAGE GLUCOSE BLD GHB EST-MCNC: 105 MG/DL
HBA1C MFR BLD: 5.3 % (ref 4–5.6)
T4 SERPL-MCNC: 5.5 UG/DL (ref 4–12)
TSH SERPL-ACNC: 2.46 UIU/ML (ref 0.35–5.5)

## 2025-03-15 PROCEDURE — 36415 COLL VENOUS BLD VENIPUNCTURE: CPT

## 2025-03-15 PROCEDURE — 84436 ASSAY OF TOTAL THYROXINE: CPT

## 2025-03-15 PROCEDURE — 83036 HEMOGLOBIN GLYCOSYLATED A1C: CPT

## 2025-03-15 PROCEDURE — 84443 ASSAY THYROID STIM HORMONE: CPT

## (undated) DEVICE — SUTURE 0 VICRYL PLUS CT-1 - 36 INCH (36/BX)

## (undated) DEVICE — ELECTRODE DUAL RETURN W/ CORD - (50/PK)

## (undated) DEVICE — SLEEVE VASO DVT COMPRESSION CALF MED - (10PR/CA)

## (undated) DEVICE — SODIUM CHL IRRIGATION 0.9% 1000ML (12EA/CA)

## (undated) DEVICE — TRAY SPINAL ANESTHESIA NON-SAFETY (10/CA)

## (undated) DEVICE — WATER IRRIGATION STERILE 1000ML (12EA/CA)

## (undated) DEVICE — PACK C-SECTION (2EA/CA)

## (undated) DEVICE — HEAD HOLDER JUNIOR/ADULT

## (undated) DEVICE — GLOVE BIOGEL SZ 6.5 SURGICAL PF LTX (50PR/BX 4BX/CA)

## (undated) DEVICE — CLOSURE SKIN STRIP 1/2 X 4 IN - (STERI STRIP) (50/BX 4BX/CA)

## (undated) DEVICE — CATHETER IV NON-SAFETY 18 GA X 1 1/4 (50/BX 4BX/CA)

## (undated) DEVICE — SUTURE 3-0 VICRYL PLUS CT-1 - 36 INCH (36/BX)

## (undated) DEVICE — TUBING CLEARLINK DUO-VENT - C-FLO (48EA/CA)

## (undated) DEVICE — KIT  I.V. START (100EA/CA)

## (undated) DEVICE — SET EXTENSION WITH 2 PORTS (48EA/CA) ***PART #2C8610 IS A SUBSTITUTE*****

## (undated) DEVICE — SWABSTICK BENZOIN SINGLE (50EA/BX)

## (undated) DEVICE — SUTURE 4-0 VICRYL PLUS FS-1 - 27 INCH (36/BX)

## (undated) DEVICE — SUTURE 2-0 VICRYL PLUS CT-1 36 (36PK/BX)"